# Patient Record
Sex: FEMALE | Race: WHITE | NOT HISPANIC OR LATINO | Employment: OTHER | URBAN - METROPOLITAN AREA
[De-identification: names, ages, dates, MRNs, and addresses within clinical notes are randomized per-mention and may not be internally consistent; named-entity substitution may affect disease eponyms.]

---

## 2017-04-08 LAB — HEMOCCULT STL QL IA: NORMAL

## 2017-11-24 ENCOUNTER — TRANSFERRED RECORDS (OUTPATIENT)
Dept: HEALTH INFORMATION MANAGEMENT | Facility: CLINIC | Age: 67
End: 2017-11-24

## 2018-05-23 ENCOUNTER — OFFICE VISIT (OUTPATIENT)
Dept: FAMILY MEDICINE | Facility: CLINIC | Age: 68
End: 2018-05-23
Payer: COMMERCIAL

## 2018-05-23 VITALS
HEART RATE: 83 BPM | DIASTOLIC BLOOD PRESSURE: 78 MMHG | OXYGEN SATURATION: 96 % | SYSTOLIC BLOOD PRESSURE: 132 MMHG | HEIGHT: 65 IN | WEIGHT: 170.6 LBS | TEMPERATURE: 98.2 F | BODY MASS INDEX: 28.42 KG/M2

## 2018-05-23 DIAGNOSIS — Z98.890 S/P KNEE SURGERY: ICD-10-CM

## 2018-05-23 DIAGNOSIS — E03.9 HYPOTHYROIDISM, UNSPECIFIED TYPE: ICD-10-CM

## 2018-05-23 DIAGNOSIS — Z76.89 ENCOUNTER TO ESTABLISH CARE: Primary | ICD-10-CM

## 2018-05-23 DIAGNOSIS — E03.9 HYPOTHYROIDISM: Primary | ICD-10-CM

## 2018-05-23 DIAGNOSIS — M19.231 SECONDARY OSTEOARTHRITIS OF RIGHT WRIST: ICD-10-CM

## 2018-05-23 PROBLEM — Z90.79 H/O TOTAL HYSTERECTOMY WITH BILATERAL SALPINGO-OOPHORECTOMY (BSO): Status: ACTIVE | Noted: 2018-05-23

## 2018-05-23 PROBLEM — Z90.722 H/O TOTAL HYSTERECTOMY WITH BILATERAL SALPINGO-OOPHORECTOMY (BSO): Status: ACTIVE | Noted: 2018-05-23

## 2018-05-23 PROBLEM — J30.2 SEASONAL ALLERGIC RHINITIS: Status: ACTIVE | Noted: 2018-05-23

## 2018-05-23 PROBLEM — Z90.710 H/O TOTAL HYSTERECTOMY WITH BILATERAL SALPINGO-OOPHORECTOMY (BSO): Status: ACTIVE | Noted: 2018-05-23

## 2018-05-23 PROCEDURE — 99203 OFFICE O/P NEW LOW 30 MIN: CPT | Performed by: FAMILY MEDICINE

## 2018-05-23 RX ORDER — LEVOTHYROXINE SODIUM 50 UG/1
50 TABLET ORAL DAILY
Refills: 3 | COMMUNITY
Start: 2018-03-10 | End: 2018-08-27

## 2018-05-23 RX ORDER — ALBUTEROL SULFATE 90 UG/1
2 AEROSOL, METERED RESPIRATORY (INHALATION) PRN
Refills: 3 | COMMUNITY
Start: 2018-02-21 | End: 2021-10-08

## 2018-05-23 ASSESSMENT — PAIN SCALES - GENERAL: PAINLEVEL: NO PAIN (0)

## 2018-05-23 NOTE — PROGRESS NOTES
SUBJECTIVE:   Radha Zurita is a 67 year old female who presents to clinic today for the following health issues:      Chief Complaint   Patient presents with     Establish Care     Wishing to establish care afyter moving into the state         Patient is a 67 yr old female here to establish care. Just recently moved back to [place]. She reports that she is in relative good health. She has a past medical history that is significant for hypothyroidism. Patient reports that she has had no problems with that. Last thyroid lab check was last year . She denies any weight gain , hair loss.   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     Problem list and histories reviewed & adjusted, as indicated.  Additional history: as documented    Patient Active Problem List   Diagnosis     Seasonal allergic rhinitis     Hypothyroidism     H/O total hysterectomy with bilateral salpingo-oophorectomy (BSO)     S/P knee surgery     No past surgical history on file.    Social History   Substance Use Topics     Smoking status: Not on file     Smokeless tobacco: Never Used     Alcohol use Not on file     No family history on file.      Current Outpatient Prescriptions   Medication Sig Dispense Refill     fluticasone-salmeterol (ADVAIR) 100-50 MCG/DOSE diskus inhaler Inhale 1 puff into the lungs every 12 hours       levothyroxine (SYNTHROID/LEVOTHROID) 50 MCG tablet Take 50 mcg by mouth daily  3     PROAIR  (90 Base) MCG/ACT inhaler Inhale 2 puffs into the lungs as needed  3     Allergies   Allergen Reactions     Cats      Dogs      Seasonal Allergies      BP Readings from Last 3 Encounters:   05/23/18 132/78    Wt Readings from Last 3 Encounters:   05/23/18 170 lb 9.6 oz (77.4 kg)                  Labs reviewed in EPIC    Reviewed and updated as needed this visit by clinical staff       Reviewed and updated as needed this visit by Provider         ROS:  Constitutional, HEENT,  "cardiovascular, pulmonary, gi and gu systems are negative, except as otherwise noted.    OBJECTIVE:     /78  Pulse 83  Temp 98.2  F (36.8  C) (Tympanic)  Ht 5' 4.75\" (1.645 m)  Wt 170 lb 9.6 oz (77.4 kg)  SpO2 96%  BMI 28.61 kg/m2  Body mass index is 28.61 kg/(m^2).  GENERAL: healthy, alert and no distress  EYES: Eyes grossly normal to inspection, PERRL and conjunctivae and sclerae normal  HENT: ear canals and TM's normal, nose and mouth without ulcers or lesions  NECK: no adenopathy, no asymmetry, masses, or scars and thyroid normal to palpation  RESP: lungs clear to auscultation - no rales, rhonchi or wheezes  CV: regular rate and rhythm, normal S1 S2, no S3 or S4, no murmur, click or rub, no peripheral edema and peripheral pulses strong  ABDOMEN: soft, nontender, no hepatosplenomegaly, no masses and bowel sounds normal  MS: decreased range of motion in right wrist   SKIN: no suspicious lesions or rashes    Diagnostic Test Results:  none     ASSESSMENT/PLAN:         (Z76.89) Encounter to establish care  (primary encounter diagnosis)  Comment: Patient here to establish care   Plan: Relatively healthy     (E03.9) Hypothyroidism, unspecified type  Comment: Thyroid stable , does not need refills now  Plan: CANCELED: TSH with free T4 reflex            (Z98.890) S/P knee surgery  Comment: Doing well in this  Regard   Plan: As above     (M19.231) Secondary osteoarthritis of right wrist  Comment: Had x-rays done in [place]. Will like injections in her wrist   Plan: ORTHO  REFERRAL              FUTURE APPOINTMENTS:       - Follow-up visit as needed    Mychal Gusman MD  [facility]    "

## 2018-05-23 NOTE — NURSING NOTE
"Initial /84  Pulse 83  Temp 98.2  F (36.8  C) (Tympanic)  Ht 5' 4.75\" (1.645 m)  Wt 170 lb 9.6 oz (77.4 kg)  SpO2 96%  BMI 28.61 kg/m2 Estimated body mass index is 28.61 kg/(m^2) as calculated from the following:    Height as of this encounter: 5' 4.75\" (1.645 m).    Weight as of this encounter: 170 lb 9.6 oz (77.4 kg). .      "

## 2018-05-23 NOTE — MR AVS SNAPSHOT
After Visit Summary   5/23/2018    Radha Zurita    MRN: [id number]           Patient Information     Date Of Birth          1950        Visit Information        Provider Department      5/23/2018 9:00 AM Mychal Gusman MD [facility]        Today's Diagnoses     Hypothyroidism, unspecified type    -  1    S/P knee surgery        Secondary osteoarthritis of right wrist           Follow-ups after your visit        Additional Services     ORTHO  REFERRAL       [facility] is referring you to the Orthopedic  Services at [facility].       The  Representative will assist you in the coordination of your Orthopedic and Musculoskeletal Care as prescribed by your physician.    The  Representative will call you within 1 business day to help schedule your appointment, or you may contact the  Representative at:    All areas ~ [phone]     Type of Referral : Surgical / Specialist       Timeframe requested: 3 - 5 days    Coverage of these services is subject to the terms and limitations of your health insurance plan.  Please call member services at your health plan with any benefit or coverage questions.      If X-rays, CT or MRI's have been performed, please contact the facility where they were done to arrange for , prior to your scheduled appointment.  Please bring this referral request to your appointment and present it to your specialist.                  Who to contact     If you have questions or need follow up information about today's clinic visit or your schedule please contact [facility] directly at [phone].  Normal or non-critical lab and imaging results will be communicated to you by MyChart, letter or phone within 4 business days after the clinic has received the results. If you do not hear from us within 7 days, please contact the clinic through Viv[employer]  "or phone. If you have a critical or abnormal lab result, we will notify you by phone as soon as possible.  Submit refill requests through Dada or call your pharmacy and they will forward the refill request to us. Please allow 3 business days for your refill to be completed.          Additional Information About Your Visit        [employer] Information     Dada lets you send messages to your doctor, view your test results, renew your prescriptions, schedule appointments and more. To sign up, go to [email or web address] . Click on \"Log in\" on the left side of the screen, which will take you to the Welcome page. Then click on \"Sign up Now\" on the right side of the page.     You will be asked to enter the access code listed below, as well as some personal information. Please follow the directions to create your username and password.     Your access code is: [email or web address]  Expires: 2018  8:51 AM     Your access code will  in 90 days. If you need help or a new code, please call your [facility] clinic or [phone].        Care EveryWhere ID     This is your Care EveryWhere ID. This could be used by other organizations to access your [facility] medical records  [id number]        Your Vitals Were     Pulse Temperature Height Pulse Oximetry BMI (Body Mass Index)       83 98.2  F (36.8  C) (Tympanic) 5' 4.75\" (1.645 m) 96% 28.61 kg/m2        Blood Pressure from Last 3 Encounters:   18 141/84    Weight from Last 3 Encounters:   18 170 lb 9.6 oz (77.4 kg)              We Performed the Following     ORTHO  REFERRAL     TSH with free T4 reflex        Primary Care Provider    None Specified       No primary provider on file.        Equal Access to Services     [facility]: Hadfatou Barraza, natalya calvo, jairon harealmada lilian, abdi bravo. So [facility] [phone].    ATENCIÓN: Si habla español, tiene a calhoun disposición servicios gratuitos de " asistencia lingüística. Maral al [phone].    We comply with applicable federal civil rights laws and [place] laws. We do not discriminate on the basis of race, color, national origin, age, disability, sex, sexual orientation, or gender identity.            Thank you!     Thank you for choosing [facility]  for your care. Our goal is always to provide you with excellent care. Hearing back from our patients is one way we can continue to improve our services. Please take a few minutes to complete the written survey that you may receive in the mail after your visit with us. Thank you!             Your Updated Medication List - Protect others around you: Learn how to safely use, store and throw away your medicines at www.disposemymeds.org.          This list is accurate as of 5/23/18  9:46 AM.  Always use your most recent med list.                   Brand Name Dispense Instructions for use Diagnosis    fluticasone-salmeterol 100-50 MCG/DOSE diskus inhaler    ADVAIR     Inhale 1 puff into the lungs every 12 hours        levothyroxine 50 MCG tablet    SYNTHROID/LEVOTHROID     Take 50 mcg by mouth daily        PROAIR  (90 Base) MCG/ACT Inhaler   Generic drug:  albuterol      Inhale 2 puffs into the lungs as needed

## 2018-06-21 ENCOUNTER — TRANSFERRED RECORDS (OUTPATIENT)
Dept: HEALTH INFORMATION MANAGEMENT | Facility: CLINIC | Age: 68
End: 2018-06-21

## 2018-08-22 DIAGNOSIS — E03.9 HYPOTHYROIDISM: ICD-10-CM

## 2018-08-22 LAB — TSH SERPL DL<=0.005 MIU/L-ACNC: 2.95 MU/L (ref 0.4–4)

## 2018-08-22 PROCEDURE — 36415 COLL VENOUS BLD VENIPUNCTURE: CPT | Performed by: FAMILY MEDICINE

## 2018-08-22 PROCEDURE — 84443 ASSAY THYROID STIM HORMONE: CPT | Performed by: FAMILY MEDICINE

## 2018-08-22 NOTE — LETTER
August 27, 2018      Radha Oquendo Parminder  [address] [address]        Dear Ms.Hanson Zurita,    We are writing to inform you of your test results.    Your test results fall within the expected range(s) or remain unchanged from previous results.  Please continue with current treatment plan.    Resulted Orders   TSH with free T4 reflex   Result Value Ref Range    TSH 2.95 0.40 - 4.00 mU/L       If you have any questions or concerns, please call the clinic at the number listed above.       Sincerely,        Mychal Gusman MD

## 2018-08-26 NOTE — PROGRESS NOTES
Please inform patient that test result was within normal parameters.   Thank you.     Mychal Gusman M.D.

## 2018-08-27 ENCOUNTER — MYC MEDICAL ADVICE (OUTPATIENT)
Dept: FAMILY MEDICINE | Facility: CLINIC | Age: 68
End: 2018-08-27

## 2018-08-27 DIAGNOSIS — E03.9 HYPOTHYROIDISM: Primary | ICD-10-CM

## 2018-08-27 RX ORDER — LEVOTHYROXINE SODIUM 50 UG/1
50 TABLET ORAL DAILY
Qty: 90 TABLET | Refills: 3 | Status: SHIPPED | OUTPATIENT
Start: 2018-08-27 | End: 2019-08-07

## 2018-10-05 ENCOUNTER — TRANSFERRED RECORDS (OUTPATIENT)
Dept: HEALTH INFORMATION MANAGEMENT | Facility: CLINIC | Age: 68
End: 2018-10-05

## 2018-10-16 ENCOUNTER — TRANSFERRED RECORDS (OUTPATIENT)
Dept: HEALTH INFORMATION MANAGEMENT | Facility: CLINIC | Age: 68
End: 2018-10-16

## 2018-10-23 ENCOUNTER — OFFICE VISIT (OUTPATIENT)
Dept: FAMILY MEDICINE | Facility: CLINIC | Age: 68
End: 2018-10-23
Payer: COMMERCIAL

## 2018-10-23 VITALS
RESPIRATION RATE: 12 BRPM | WEIGHT: 141 LBS | HEIGHT: 65 IN | SYSTOLIC BLOOD PRESSURE: 106 MMHG | HEART RATE: 77 BPM | DIASTOLIC BLOOD PRESSURE: 64 MMHG | BODY MASS INDEX: 23.49 KG/M2 | TEMPERATURE: 97.3 F | OXYGEN SATURATION: 98 %

## 2018-10-23 DIAGNOSIS — M19.031 PRIMARY OSTEOARTHRITIS OF RIGHT WRIST: ICD-10-CM

## 2018-10-23 DIAGNOSIS — M65.4 TENOSYNOVITIS, DE QUERVAIN: ICD-10-CM

## 2018-10-23 DIAGNOSIS — Z01.818 PREOP GENERAL PHYSICAL EXAM: Primary | ICD-10-CM

## 2018-10-23 PROCEDURE — 99214 OFFICE O/P EST MOD 30 MIN: CPT | Performed by: FAMILY MEDICINE

## 2018-10-23 NOTE — MR AVS SNAPSHOT
After Visit Summary   10/23/2018    Radha Zurita    MRN: [id number]           Patient Information     Date Of Birth          1950        Visit Information        Provider Department      10/23/2018 9:00 AM Mychal Gusman MD [facility]        Today's Diagnoses     Preop general physical exam    -  1      Care Instructions      Before Your Surgery      Call your surgeon if there is any change in your health. This includes signs of a cold or flu (such as a sore throat, runny nose, cough, rash or fever).    Do not smoke, drink alcohol or take over the counter medicine (unless your surgeon or primary care doctor tells you to) for the 24 hours before and after surgery.    If you take prescribed drugs: Follow your doctor s orders about which medicines to take and which to stop until after surgery.    Eating and drinking prior to surgery: follow the instructions from your surgeon  Take a shower or bath the night before surgery. Use the soap your surgeon gave you to gently clean your skin. If you do not have soap from your surgeon, use your regular soap. Do not shave or scrub the surgery site.  Wear clean pajamas and have clean sheets on your bed.   Presurgery Checklist  You are scheduled to have surgery. The healthcare staff will try to make your stay comfortable. Use the guidelines below to remind yourself what to do before surgery. Be sure to follow any specific pre-op instructions from your surgeon or nurse.  Preparing for Surgery  Ask your surgeon if you ll need a blood transfusion during surgery and if so, how to prepare for it. In some cases, you can donate blood before surgery. If needed, this blood can be given back (transfused) to you during or after surgery.  If you are having abdominal surgery, ask what you need to do to clear your bowel.  Tell your surgeon if you have allergies to any medications or foods.  Arrange for an adult family member or friend to drive  you home after surgery. If possible, have someone ready to help you at home as you recover.  Call the surgeon if you get a cold, fever, sore throat, diarrhea, or other health problem just before surgery. Your surgeon can decide whether or not to postpone the surgery.  Medications  Tell your surgeon about all medications you take, including prescription and over-the-counter products such as herbal remedies and vitamins. Ask if you should continue taking them.  If you take ibuprofen, naproxen, or  blood thinners  such as aspirin, clopidogrel (Plavix), or warfarin (Coumadin), ask your surgeon whether you should stop taking them and how long before surgery you should stop.  You may be told to take antibiotics just before surgery to prevent infection. If so, follow instructions carefully on how to take them.  If you are told to take medications called anticoagulants to prevent blood clots after surgery, be sure to follow the instructions on how to take them.  Stop Smoking  If you smoke, healing may take longer. So at least 2 week(s) before surgery, stop smoking.  Bathing or Showering Before Surgery  If instructed, wash with antibacterial soap. Afterward, do not use lotions or powders.  If you are having surgery on the head, you may be asked to shampoo with antibacterial soap. Follow instructions for doing so.  Do Not Remove Hair from the Surgery Site  Do not shave hair from the incision site, unless you are given specific instructions to do so. Usually, if hair needs to be removed, it will be done at the hospital right before surgery.  Don t Eat or Drink  Your doctor will tell you when to stop eating and drinking. If you do not follow your doctor's instructions, your procedure may be postponed or rescheduled for another day.  If your surgeon tells you to continue any medications, take them with small sips of water.  You can brush your teeth and rinse your mouth, but don t swallow any water.  Day of Surgery  Do not wear  makeup. Do not use perfume, deodorant, or hairspray. Remove nail polish and artificial nails.  Leave jewelry (including rings), watches, and other valuables at home.  Be sure to bring health insurance cards or forms and a photo ID.  Bring a list of your medications (include the name, dose, how often you take them, and the time last dose was taken).  Arrive on time at the hospital or surgery facility.    9357-2133 [employer]. [address] [place] [address]. All rights reserved. This information is not intended as a substitute for professional medical care. Always follow your healthcare professional's instructions.  This information has been modified by your health care provider with permission from the publisher.              Follow-ups after your visit        Your next 10 appointments already scheduled     Nov 20, 2018   Procedure with Michoacano Dunn MD   [facility] (--)    [address] [place] [id number]   [phone]           The medical center is located at [address] (between [address] in [place], four miles north of [place]).              Who to contact     If you have questions or need follow up information about today's clinic visit or your schedule please contact [facility] directly at [phone].  Normal or non-critical lab and imaging results will be communicated to you by MyChart, letter or phone within 4 business days after the clinic has received the results. If you do not hear from us within 7 days, please contact the clinic through MyChart or phone. If you have a critical or abnormal lab result, we will notify you by phone as soon as possible.  Submit refill requests through GetThis or call your pharmacy and they will forward the refill request to us. Please allow 3 business days for your refill to be completed.          Additional Information About Your Visit        MyChart Information     ShelfF[employer]pt  "gives you secure access to your electronic health record. If you see a primary care provider, you can also send messages to your care team and make appointments. If you have questions, please call your primary care clinic.  If you do not have a primary care provider, please call [phone] and they will assist you.        Care EveryWhere ID     This is your Care EveryWhere ID. This could be used by other organizations to access your [facility] medical records  [id number]        Your Vitals Were     Pulse Temperature Respirations Height Pulse Oximetry BMI (Body Mass Index)    77 97.3  F (36.3  C) (Tympanic) 12 5' 4.75\" (1.645 m) 98% 23.65 kg/m2       Blood Pressure from Last 3 Encounters:   10/23/18 106/64   05/23/18 132/78    Weight from Last 3 Encounters:   10/23/18 141 lb (64 kg)   05/23/18 170 lb 9.6 oz (77.4 kg)              Today, you had the following     No orders found for display       Primary Care Provider Fax #    Physician No Ref-Primary [phone]       No address on file        Equal Access to Services     [facility] : Hadii honorio cuellaro Sorodger, waaxda luqadaha, qaybta kaalmada lilian, abdi casanova . So [facility] [phone].    ATENCIÓN: Si habla español, tiene a calhoun disposición servicios gratuitos de asistencia lingüística. Maral al [phone].    We comply with applicable federal civil rights laws and [place] laws. We do not discriminate on the basis of race, color, national origin, age, disability, sex, sexual orientation, or gender identity.            Thank you!     Thank you for choosing [facility]  for your care. Our goal is always to provide you with excellent care. Hearing back from our patients is one way we can continue to improve our services. Please take a few minutes to complete the written survey that you may receive in the mail after your visit with us. Thank you!             Your Updated Medication List - Protect others around you: " Learn how to safely use, store and throw away your medicines at www.disposemymeds.org.          This list is accurate as of 10/23/18  9:20 AM.  Always use your most recent med list.                   Brand Name Dispense Instructions for use Diagnosis    fluticasone-salmeterol 100-50 MCG/DOSE diskus inhaler    ADVAIR     Inhale 1 puff into the lungs every 12 hours        levothyroxine 50 MCG tablet    SYNTHROID/LEVOTHROID    90 tablet    Take 1 tablet (50 mcg) by mouth daily    Hypothyroidism       PROAIR  (90 Base) MCG/ACT inhaler   Generic drug:  albuterol      Inhale 2 puffs into the lungs as needed

## 2018-10-23 NOTE — PROGRESS NOTES
[facility]  [address] [facility] [address]  [phone]  Dept: [phone]    PRE-OP EVALUATION:  Today's date: 10/23/2018    Radha Zurita (: 1950) presents for pre-operative evaluation assessment as requested by Dr. healy.  She requires evaluation and anesthesia risk assessment prior to undergoing surgery/procedure for treatment of   Right Wrist 1st Dorsal Compartment Release Right      .    Proposed Surgery/ Procedure: Right Wrist 1st Dorsal Compartment Release  Date of Surgery/ Procedure: 18  Time of Surgery/ Procedure: 1:30pm  Hospital/Surgical Facility: [facility] [place]    Primary Physician: No Ref-Primary, Physician  Type of Anesthesia Anticipated: Monitor Anesthesia Care    Patient has a Health Care Directive or Living Will:  YES at home     1. NO - Do you have a history of heart attack, stroke, stent, bypass or surgery on an artery in the head, neck, heart or legs?  2. NO - Do you ever have any pain or discomfort in your chest?  3. NO - Do you have a history of  Heart Failure?  4. NO - Are you troubled by shortness of breath when: walking on the level, up a slight hill or at night?  5. NO - Do you currently have a cold, bronchitis or other respiratory infection?  6. NO - Do you have a cough, shortness of breath or wheezing?  7. NO - Do you sometimes get pains in the calves of your legs when you walk?  8. NO - Do you or anyone in your family have previous history of blood clots?  9. NO - Do you or does anyone in your family have a serious bleeding problem such as prolonged bleeding following surgeries or cuts?  10. NO - Have you ever had problems with anemia or been told to take iron pills?  11. NO - Have you had any abnormal blood loss such as black, tarry or bloody stools, or abnormal vaginal bleeding?  12. NO - Have you ever had a blood transfusion?  13. NO - Have you or any of your relatives ever had problems with anesthesia?  14. NO - Do you have sleep apnea,  excessive snoring or daytime drowsiness?  15. NO - Do you have any prosthetic heart valves?  16. NO - Do you have prosthetic joints?  17. NO - Is there any chance that you may be pregnant?      HPI:     HPI related to upcoming procedure: Patient is a 68 yr old female here for a pre op evaluation. Patient is having a right wrist 1st dorsal compartment release. Patient states that she has had carpal tunnel release in the same hand before. No acute symptoms today and she feels well. She has never had a cardiovascular event .       See problem list for active medical problems.  Problems all longstanding and stable, except as noted/documented.  See ROS for pertinent symptoms related to these conditions.                                                                                                                                                          .    MEDICAL HISTORY:     Patient Active Problem List    Diagnosis Date Noted     Seasonal allergic rhinitis 05/23/2018     Priority: Medium     Hypothyroidism 05/23/2018     Priority: Medium     H/O total hysterectomy with bilateral salpingo-oophorectomy (BSO) 05/23/2018     Priority: Medium     S/P knee surgery 05/23/2018     Priority: Medium     Left knee        History reviewed. No pertinent past medical history.  History reviewed. No pertinent surgical history.  Current Outpatient Prescriptions   Medication Sig Dispense Refill     fluticasone-salmeterol (ADVAIR) 100-50 MCG/DOSE diskus inhaler Inhale 1 puff into the lungs every 12 hours       levothyroxine (SYNTHROID/LEVOTHROID) 50 MCG tablet Take 1 tablet (50 mcg) by mouth daily 90 tablet 3     PROAIR  (90 Base) MCG/ACT inhaler Inhale 2 puffs into the lungs as needed  3     OTC products: None, except as noted above    Allergies   Allergen Reactions     Cats      Dogs      Seasonal Allergies       Latex Allergy: NO    Social History   Substance Use Topics     Smoking status: Former Smoker     Smokeless tobacco:  "Never Used     Alcohol use Not on file     History   Drug Use Not on file       REVIEW OF SYSTEMS:   CONSTITUTIONAL: NEGATIVE for fever, chills, change in weight  INTEGUMENTARY/SKIN: NEGATIVE for worrisome rashes, moles or lesions  EYES: NEGATIVE for vision changes or irritation  ENT/MOUTH: NEGATIVE for ear, mouth and throat problems  RESP: NEGATIVE for significant cough or SOB  CV: NEGATIVE for chest pain, palpitations or peripheral edema  GI: NEGATIVE for nausea, abdominal pain, heartburn, or change in bowel habits  : NEGATIVE for frequency, dysuria, or hematuria  MUSCULOSKELETAL:POSITIVE  for joint pain right wrist   ENDOCRINE: NEGATIVE for temperature intolerance, skin/hair changes  HEME: NEGATIVE for bleeding problems  PSYCHIATRIC: NEGATIVE for changes in mood or affect    EXAM:   /64  Pulse 77  Temp 97.3  F (36.3  C) (Tympanic)  Resp 12  Ht 5' 4.75\" (1.645 m)  Wt 141 lb (64 kg)  SpO2 98%  BMI 23.65 kg/m2    GENERAL APPEARANCE: healthy, alert and no distress     EYES: EOMI, PERRL     HENT: ear canals and TM's normal and nose and mouth without ulcers or lesions     NECK: no adenopathy, no asymmetry, masses, or scars and thyroid normal to palpation     RESP: lungs clear to auscultation - no rales, rhonchi or wheezes     CV: regular rates and rhythm, normal S1 S2, no S3 or S4 and no murmur, click or rub     ABDOMEN:  soft, nontender, no HSM or masses and bowel sounds normal     MS: decreased range of motion right wrist      SKIN: no suspicious lesions or rashes     PSYCH: mentation appears normal. and affect normal/bright     LYMPHATICS: No cervical adenopathy    DIAGNOSTICS:   No labs or EKG required for low risk surgery (cataract, skin procedure, breast biopsy, etc)    No results for input(s): HGB, PLT, INR, NA, POTASSIUM, CR, A1C in the last 83128 hours.     IMPRESSION:   Reason for surgery/procedure: Right wrist tendonitis   Diagnosis/reason for consult: pre op evaluation     The proposed " surgical procedure is considered LOW risk.    REVISED CARDIAC RISK INDEX  The patient has the following serious cardiovascular risks for perioperative complications such as (MI, PE, VFib and 3  AV Block):  No serious cardiac risks  INTERPRETATION: 0 risks: Class I (very low risk - 0.4% complication rate)    The patient has the following additional risks for perioperative complications:  No identified additional risks  The ASCVD Risk score ([place] YOANDY Jr, et al., 2013) failed to calculate for the following reasons:    Cannot find a previous HDL lab    Cannot find a previous total cholesterol lab      ICD-10-CM    1. Preop general physical exam Z01.818    2. 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 Tenosynovitis, de Quervain M65.4        RECOMMENDATIONS:       Cardiovascular Risk  Performs 4 METs exercise without symptoms (Light housework (dusting, washing dishes)) .       --Patient is to take all scheduled medications on the day of surgery EXCEPT for modifications listed below.    Anticoagulant or Antiplatelet Medication Use  ASPIRIN: Discontinue ASA 7-10 days prior to procedure to reduce bleeding risk.  It should be resumed post-operatively.  NSAIDS: Ibuprofen (Motrin):         Stop one week prior to surgery  Naproxen (Naprosyn):   Stop 1 week prior to surgery        APPROVAL GIVEN to proceed with proposed procedure, without further diagnostic evaluation       Signed Electronically by: Mychal Gusman MD    Copy of this evaluation report is provided to requesting physician.    [place] Preop Guidelines    Revised Cardiac Risk Index

## 2018-10-23 NOTE — PATIENT INSTRUCTIONS
Before Your Surgery      Call your surgeon if there is any change in your health. This includes signs of a cold or flu (such as a sore throat, runny nose, cough, rash or fever).    Do not smoke, drink alcohol or take over the counter medicine (unless your surgeon or primary care doctor tells you to) for the 24 hours before and after surgery.    If you take prescribed drugs: Follow your doctor s orders about which medicines to take and which to stop until after surgery.    Eating and drinking prior to surgery: follow the instructions from your surgeon  Take a shower or bath the night before surgery. Use the soap your surgeon gave you to gently clean your skin. If you do not have soap from your surgeon, use your regular soap. Do not shave or scrub the surgery site.  Wear clean pajamas and have clean sheets on your bed.   Presurgery Checklist  You are scheduled to have surgery. The healthcare staff will try to make your stay comfortable. Use the guidelines below to remind yourself what to do before surgery. Be sure to follow any specific pre-op instructions from your surgeon or nurse.  Preparing for Surgery  Ask your surgeon if you ll need a blood transfusion during surgery and if so, how to prepare for it. In some cases, you can donate blood before surgery. If needed, this blood can be given back (transfused) to you during or after surgery.  If you are having abdominal surgery, ask what you need to do to clear your bowel.  Tell your surgeon if you have allergies to any medications or foods.  Arrange for an adult family member or friend to drive you home after surgery. If possible, have someone ready to help you at home as you recover.  Call the surgeon if you get a cold, fever, sore throat, diarrhea, or other health problem just before surgery. Your surgeon can decide whether or not to postpone the surgery.  Medications  Tell your surgeon about all medications you take, including prescription and over-the-counter  products such as herbal remedies and vitamins. Ask if you should continue taking them.  If you take ibuprofen, naproxen, or  blood thinners  such as aspirin, clopidogrel (Plavix), or warfarin (Coumadin), ask your surgeon whether you should stop taking them and how long before surgery you should stop.  You may be told to take antibiotics just before surgery to prevent infection. If so, follow instructions carefully on how to take them.  If you are told to take medications called anticoagulants to prevent blood clots after surgery, be sure to follow the instructions on how to take them.  Stop Smoking  If you smoke, healing may take longer. So at least 2 week(s) before surgery, stop smoking.  Bathing or Showering Before Surgery  If instructed, wash with antibacterial soap. Afterward, do not use lotions or powders.  If you are having surgery on the head, you may be asked to shampoo with antibacterial soap. Follow instructions for doing so.  Do Not Remove Hair from the Surgery Site  Do not shave hair from the incision site, unless you are given specific instructions to do so. Usually, if hair needs to be removed, it will be done at the hospital right before surgery.  Don t Eat or Drink  Your doctor will tell you when to stop eating and drinking. If you do not follow your doctor's instructions, your procedure may be postponed or rescheduled for another day.  If your surgeon tells you to continue any medications, take them with small sips of water.  You can brush your teeth and rinse your mouth, but don t swallow any water.  Day of Surgery  Do not wear makeup. Do not use perfume, deodorant, or hairspray. Remove nail polish and artificial nails.  Leave jewelry (including rings), watches, and other valuables at home.  Be sure to bring health insurance cards or forms and a photo ID.  Bring a list of your medications (include the name, dose, how often you take them, and the time last dose was taken).  Arrive on time at the  hospital or surgery facility.    5134-0735 [employer]. [address] [place] [address], [place] [address]. All rights reserved. This information is not intended as a substitute for professional medical care. Always follow your healthcare professional's instructions.  This information has been modified by your health care provider with permission from the publisher.

## 2018-11-19 ENCOUNTER — ANESTHESIA EVENT (OUTPATIENT)
Dept: SURGERY | Facility: CLINIC | Age: 68
End: 2018-11-19
Payer: COMMERCIAL

## 2018-11-19 NOTE — ANESTHESIA PREPROCEDURE EVALUATION
Anesthesia Evaluation     . Pt has had prior anesthetic. Type: General           ROS/MED HX    ENT/Pulmonary:     (+)allergic rhinitis, tobacco use, Past use Intermittent asthma Treatment: Inhaler prn,  , . .    Neurologic:  - neg neurologic ROS     Cardiovascular:  - neg cardiovascular ROS       METS/Exercise Tolerance:  >4 METS   Hematologic:  - neg hematologic  ROS       Musculoskeletal:   (+) arthritis, , , -       GI/Hepatic:  - neg GI/hepatic ROS       Renal/Genitourinary:  - ROS Renal section negative       Endo:     (+) thyroid problem hypothyroidism, .      Psychiatric:  - neg psychiatric ROS       Infectious Disease:  - neg infectious disease ROS       Malignancy:      - no malignancy   Other:    - neg other ROS                 Physical Exam  Normal systems: cardiovascular, pulmonary and dental    Airway   Mallampati: I  TM distance: >3 FB  Neck ROM: full    Dental     Cardiovascular       Pulmonary                     Anesthesia Plan      History & Physical Review  History and physical reviewed and following examination; no interval change.    ASA Status:  2 .    NPO Status:  > 8 hours    Plan for MAC Maintenance will be Balanced.  Reason for MAC:  Deep or markedly invasive procedure (G8)  PONV prophylaxis:  Ondansetron (or other 5HT-3) and Dexamethasone or Solumedrol       Postoperative Care  Postoperative pain management:  IV analgesics and Oral pain medications.      Consents  Anesthetic plan, risks, benefits and alternatives discussed with:  Patient and Spouse..                          .

## 2018-11-20 ENCOUNTER — HOSPITAL ENCOUNTER (OUTPATIENT)
Facility: CLINIC | Age: 68
Discharge: HOME | End: 2018-11-20
Attending: ORTHOPAEDIC SURGERY | Admitting: ORTHOPAEDIC SURGERY
Payer: COMMERCIAL

## 2018-11-20 ENCOUNTER — SURGERY (OUTPATIENT)
Age: 68
End: 2018-11-20

## 2018-11-20 ENCOUNTER — ANESTHESIA (OUTPATIENT)
Dept: SURGERY | Facility: CLINIC | Age: 68
End: 2018-11-20
Payer: COMMERCIAL

## 2018-11-20 VITALS
RESPIRATION RATE: 16 BRPM | HEART RATE: 78 BPM | DIASTOLIC BLOOD PRESSURE: 78 MMHG | BODY MASS INDEX: 23.49 KG/M2 | SYSTOLIC BLOOD PRESSURE: 117 MMHG | TEMPERATURE: 98.3 F | OXYGEN SATURATION: 100 % | HEIGHT: 65 IN | WEIGHT: 141 LBS

## 2018-11-20 DIAGNOSIS — M65.4 RADIAL STYLOID TENOSYNOVITIS (DE QUERVAIN): Primary | ICD-10-CM

## 2018-11-20 PROCEDURE — 25000125 ZZHC RX 250: Performed by: NURSE ANESTHETIST, CERTIFIED REGISTERED

## 2018-11-20 PROCEDURE — 25000132 ZZH RX MED GY IP 250 OP 250 PS 637: Performed by: PHYSICIAN ASSISTANT

## 2018-11-20 PROCEDURE — 71000027 ZZH RECOVERY PHASE 2 EACH 15 MINS: Performed by: ORTHOPAEDIC SURGERY

## 2018-11-20 PROCEDURE — 37000008 ZZH ANESTHESIA TECHNICAL FEE, 1ST 30 MIN: Performed by: ORTHOPAEDIC SURGERY

## 2018-11-20 PROCEDURE — 25000125 ZZHC RX 250: Performed by: ORTHOPAEDIC SURGERY

## 2018-11-20 PROCEDURE — 25000128 H RX IP 250 OP 636: Performed by: NURSE ANESTHETIST, CERTIFIED REGISTERED

## 2018-11-20 PROCEDURE — 36000052 ZZH SURGERY LEVEL 2 EA 15 ADDTL MIN: Performed by: ORTHOPAEDIC SURGERY

## 2018-11-20 PROCEDURE — 36000050 ZZH SURGERY LEVEL 2 1ST 30 MIN: Performed by: ORTHOPAEDIC SURGERY

## 2018-11-20 PROCEDURE — 27210794 ZZH OR GENERAL SUPPLY STERILE: Performed by: ORTHOPAEDIC SURGERY

## 2018-11-20 PROCEDURE — 37000009 ZZH ANESTHESIA TECHNICAL FEE, EACH ADDTL 15 MIN: Performed by: ORTHOPAEDIC SURGERY

## 2018-11-20 PROCEDURE — 40000306 ZZH STATISTIC PRE PROC ASSESS II: Performed by: ORTHOPAEDIC SURGERY

## 2018-11-20 PROCEDURE — 25000128 H RX IP 250 OP 636: Performed by: ORTHOPAEDIC SURGERY

## 2018-11-20 RX ORDER — MEPERIDINE HYDROCHLORIDE 25 MG/ML
12.5 INJECTION INTRAMUSCULAR; INTRAVENOUS; SUBCUTANEOUS
Status: DISCONTINUED | OUTPATIENT
Start: 2018-11-20 | End: 2018-11-20 | Stop reason: HOSPADM

## 2018-11-20 RX ORDER — LIDOCAINE HYDROCHLORIDE 10 MG/ML
INJECTION, SOLUTION INFILTRATION; PERINEURAL PRN
Status: DISCONTINUED | OUTPATIENT
Start: 2018-11-20 | End: 2018-11-20

## 2018-11-20 RX ORDER — SODIUM CHLORIDE, SODIUM LACTATE, POTASSIUM CHLORIDE, CALCIUM CHLORIDE 600; 310; 30; 20 MG/100ML; MG/100ML; MG/100ML; MG/100ML
INJECTION, SOLUTION INTRAVENOUS CONTINUOUS
Status: DISCONTINUED | OUTPATIENT
Start: 2018-11-20 | End: 2018-11-20 | Stop reason: HOSPADM

## 2018-11-20 RX ORDER — HYDROMORPHONE HYDROCHLORIDE 1 MG/ML
.3-.5 INJECTION, SOLUTION INTRAMUSCULAR; INTRAVENOUS; SUBCUTANEOUS EVERY 10 MIN PRN
Status: DISCONTINUED | OUTPATIENT
Start: 2018-11-20 | End: 2018-11-20 | Stop reason: HOSPADM

## 2018-11-20 RX ORDER — FENTANYL CITRATE 50 UG/ML
25-50 INJECTION, SOLUTION INTRAMUSCULAR; INTRAVENOUS
Status: CANCELLED | OUTPATIENT
Start: 2018-11-20

## 2018-11-20 RX ORDER — ACETAMINOPHEN 325 MG/1
975 TABLET ORAL ONCE
Status: COMPLETED | OUTPATIENT
Start: 2018-11-20 | End: 2018-11-20

## 2018-11-20 RX ORDER — GABAPENTIN 100 MG/1
100 CAPSULE ORAL
Status: COMPLETED | OUTPATIENT
Start: 2018-11-20 | End: 2018-11-20

## 2018-11-20 RX ORDER — GLYCOPYRROLATE 0.2 MG/ML
INJECTION, SOLUTION INTRAMUSCULAR; INTRAVENOUS PRN
Status: DISCONTINUED | OUTPATIENT
Start: 2018-11-20 | End: 2018-11-20

## 2018-11-20 RX ORDER — DEXAMETHASONE SODIUM PHOSPHATE 4 MG/ML
4 INJECTION, SOLUTION INTRA-ARTICULAR; INTRALESIONAL; INTRAMUSCULAR; INTRAVENOUS; SOFT TISSUE EVERY 10 MIN PRN
Status: DISCONTINUED | OUTPATIENT
Start: 2018-11-20 | End: 2018-11-20 | Stop reason: HOSPADM

## 2018-11-20 RX ORDER — HYDROCODONE BITARTRATE AND ACETAMINOPHEN 5; 325 MG/1; MG/1
1 TABLET ORAL
Status: DISCONTINUED | OUTPATIENT
Start: 2018-11-20 | End: 2018-11-20 | Stop reason: HOSPADM

## 2018-11-20 RX ORDER — ALBUTEROL SULFATE 0.83 MG/ML
2.5 SOLUTION RESPIRATORY (INHALATION) EVERY 4 HOURS PRN
Status: CANCELLED | OUTPATIENT
Start: 2018-11-20

## 2018-11-20 RX ORDER — BUPIVACAINE HYDROCHLORIDE 5 MG/ML
INJECTION, SOLUTION PERINEURAL PRN
Status: DISCONTINUED | OUTPATIENT
Start: 2018-11-20 | End: 2018-11-20 | Stop reason: HOSPADM

## 2018-11-20 RX ORDER — DEXAMETHASONE SODIUM PHOSPHATE 4 MG/ML
INJECTION, SOLUTION INTRA-ARTICULAR; INTRALESIONAL; INTRAMUSCULAR; INTRAVENOUS; SOFT TISSUE PRN
Status: DISCONTINUED | OUTPATIENT
Start: 2018-11-20 | End: 2018-11-20

## 2018-11-20 RX ORDER — CELECOXIB 200 MG/1
200 CAPSULE ORAL
Status: DISCONTINUED | OUTPATIENT
Start: 2018-11-20 | End: 2018-11-20 | Stop reason: HOSPADM

## 2018-11-20 RX ORDER — ONDANSETRON 2 MG/ML
INJECTION INTRAMUSCULAR; INTRAVENOUS PRN
Status: DISCONTINUED | OUTPATIENT
Start: 2018-11-20 | End: 2018-11-20

## 2018-11-20 RX ORDER — ONDANSETRON 2 MG/ML
4 INJECTION INTRAMUSCULAR; INTRAVENOUS EVERY 30 MIN PRN
Status: DISCONTINUED | OUTPATIENT
Start: 2018-11-20 | End: 2018-11-20 | Stop reason: HOSPADM

## 2018-11-20 RX ORDER — FENTANYL CITRATE 50 UG/ML
INJECTION, SOLUTION INTRAMUSCULAR; INTRAVENOUS PRN
Status: DISCONTINUED | OUTPATIENT
Start: 2018-11-20 | End: 2018-11-20

## 2018-11-20 RX ORDER — HYDROCODONE BITARTRATE AND ACETAMINOPHEN 5; 325 MG/1; MG/1
1 TABLET ORAL EVERY 4 HOURS PRN
Qty: 8 TABLET | Refills: 0
Start: 2018-11-20 | End: 2019-08-07

## 2018-11-20 RX ORDER — LIDOCAINE 40 MG/G
CREAM TOPICAL
Status: DISCONTINUED | OUTPATIENT
Start: 2018-11-20 | End: 2018-11-20 | Stop reason: HOSPADM

## 2018-11-20 RX ORDER — PROPOFOL 10 MG/ML
INJECTION, EMULSION INTRAVENOUS CONTINUOUS PRN
Status: DISCONTINUED | OUTPATIENT
Start: 2018-11-20 | End: 2018-11-20

## 2018-11-20 RX ORDER — ONDANSETRON 4 MG/1
4 TABLET, ORALLY DISINTEGRATING ORAL EVERY 30 MIN PRN
Status: DISCONTINUED | OUTPATIENT
Start: 2018-11-20 | End: 2018-11-20 | Stop reason: HOSPADM

## 2018-11-20 RX ORDER — NALOXONE HYDROCHLORIDE 0.4 MG/ML
.1-.4 INJECTION, SOLUTION INTRAMUSCULAR; INTRAVENOUS; SUBCUTANEOUS
Status: DISCONTINUED | OUTPATIENT
Start: 2018-11-20 | End: 2018-11-20 | Stop reason: HOSPADM

## 2018-11-20 RX ORDER — LIDOCAINE HYDROCHLORIDE 10 MG/ML
INJECTION, SOLUTION INFILTRATION; PERINEURAL PRN
Status: DISCONTINUED | OUTPATIENT
Start: 2018-11-20 | End: 2018-11-20 | Stop reason: HOSPADM

## 2018-11-20 RX ADMIN — DEXAMETHASONE SODIUM PHOSPHATE 4 MG: 4 INJECTION, SOLUTION INTRA-ARTICULAR; INTRALESIONAL; INTRAMUSCULAR; INTRAVENOUS; SOFT TISSUE at 13:38

## 2018-11-20 RX ADMIN — SODIUM CHLORIDE, POTASSIUM CHLORIDE, SODIUM LACTATE AND CALCIUM CHLORIDE: 600; 310; 30; 20 INJECTION, SOLUTION INTRAVENOUS at 12:40

## 2018-11-20 RX ADMIN — ONDANSETRON 4 MG: 2 INJECTION INTRAMUSCULAR; INTRAVENOUS at 13:38

## 2018-11-20 RX ADMIN — GABAPENTIN 100 MG: 100 CAPSULE ORAL at 12:42

## 2018-11-20 RX ADMIN — MIDAZOLAM 2 MG: 1 INJECTION INTRAMUSCULAR; INTRAVENOUS at 13:34

## 2018-11-20 RX ADMIN — LIDOCAINE HYDROCHLORIDE 0.2 ML: 10 INJECTION, SOLUTION EPIDURAL; INFILTRATION; INTRACAUDAL; PERINEURAL at 12:40

## 2018-11-20 RX ADMIN — GENTAMICIN SULFATE 100 ML: 40 INJECTION, SOLUTION INTRAMUSCULAR; INTRAVENOUS at 14:00

## 2018-11-20 RX ADMIN — FENTANYL CITRATE 50 MCG: 50 INJECTION, SOLUTION INTRAMUSCULAR; INTRAVENOUS at 13:38

## 2018-11-20 RX ADMIN — LIDOCAINE HYDROCHLORIDE 2.5 ML: 10 INJECTION, SOLUTION INFILTRATION; PERINEURAL at 13:54

## 2018-11-20 RX ADMIN — GLYCOPYRROLATE 0.1 MG: 0.2 INJECTION, SOLUTION INTRAMUSCULAR; INTRAVENOUS at 13:34

## 2018-11-20 RX ADMIN — ACETAMINOPHEN 975 MG: 325 TABLET, FILM COATED ORAL at 12:42

## 2018-11-20 RX ADMIN — BUPIVACAINE HYDROCHLORIDE 2.5 ML: 5 INJECTION, SOLUTION PERINEURAL at 13:54

## 2018-11-20 RX ADMIN — PROPOFOL 75 MCG/KG/MIN: 10 INJECTION, EMULSION INTRAVENOUS at 13:38

## 2018-11-20 RX ADMIN — GLYCOPYRROLATE 0.1 MG: 0.2 INJECTION, SOLUTION INTRAMUSCULAR; INTRAVENOUS at 13:36

## 2018-11-20 RX ADMIN — LIDOCAINE HYDROCHLORIDE 100 MG: 10 INJECTION, SOLUTION INFILTRATION; PERINEURAL at 13:38

## 2018-11-20 RX ADMIN — FENTANYL CITRATE 50 MCG: 50 INJECTION, SOLUTION INTRAMUSCULAR; INTRAVENOUS at 13:36

## 2018-11-20 ASSESSMENT — LIFESTYLE VARIABLES: TOBACCO_USE: 1

## 2018-11-20 NOTE — ANESTHESIA POSTPROCEDURE EVALUATION
Patient: Radha Zurita    Procedure(s):  Right Wrist 1st Dorsal Compartment Release    Diagnosis:right wrist dequervains tenosynovitis  Diagnosis Additional Information: No value filed.    Anesthesia Type:  No value filed.    Note:  Anesthesia Post Evaluation    Patient location during evaluation: Phase 2  Patient participation: Able to fully participate in evaluation  Level of consciousness: awake and alert  Pain management: adequate  Airway patency: patent  Cardiovascular status: acceptable and hemodynamically stable  Respiratory status: acceptable, room air and spontaneous ventilation  Hydration status: acceptable  PONV: none     Anesthetic complications: None          Last vitals:  Vitals:    11/20/18 1219   BP: 128/84   Pulse: 78   Resp: 16   Temp: 36.8  C (98.3  F)         Electronically Signed By: PIPO Zayas CRNA  November 20, 2018  1:44 PM

## 2018-11-20 NOTE — OP NOTE
[facility]   Operative Note    SURGEON:  Michoacano Dunn M.D.    ASSISTANT:  Yamilet Quispe PA-C    PREOPERATIVE DIAGNOSES  right wrist de Quervain's tenosynovitis    POSTOPERATIVE DIAGNOSES  right wrist de Quervain's tenosynovitis    OPERATIONS  right wrist 1st dorsal compartment release    ANESTHESIA  Local anesthesia with sedation    SPECIMENS  None.    DRAINS  None.    COMPLICATIONS  None.    ESTIMATED BLOOD LOSS  * No values recorded between 11/20/2018  1:54 PM and 11/20/2018  2:09 PM *    PROCEDURE  After discussing the risks, benefits, and alternatives to the procedure with the patient, the patient consented to proceed with the surgery as described below.  She understands the potential for neurovascular injury, infection, wound healing problems, tendon subluxation, recurrence, decreased range of motion, and a decreased quality of life.    The patient was brought to the operating room and was placed on the operating table in a supine position.  Anesthesia was provided by the anesthesiology staff in the manner described above.  A tourniquet was applied to the right upper extremity, the extremity was exsanguinated and the tourniquet was elevated to 250 mmHg for the duration of the case.  a 50:50 combination of 0.5% marcaine and 1% lidocaine was injected around the intended operative site over the radial styloid region of the left wrist.    A 2-cm oblique incision was made over the radial styloid of the right wrist overlying the distribution of the 1st extensor tendon compartment.  The superficial branch of the radial nerve was identified and was retracted dorsally.  The retinaculum was markedly thickened and a longitudinal incision was made through the dosum of this structure.  No septum was identified.    There were 4 slips of APL tendon and 1 slips of EPB tendon.  The tendons were inflamed in appearance.  A volar-based flap of retinaculum was created to prevent volar subluxation  of the tendons.    A small Z-plasty was performed and the retinaculum was very gently reapproximated in a very loose position to avoid re-constricture.  The incision was thoroughly irrigated and the incision was reapproximated with 4-0 Monocryl suture.  A well-padded thumb spica splint was applied.  The tourniquet was deflated for a total tourniquet time of 11 minutes.    The patient was taken to the recovery room in stable condition.

## 2018-11-20 NOTE — H&P (VIEW-ONLY)
[facility]  [address] [address]  [phone]  Dept: [phone]    PRE-OP EVALUATION:  Today's date: 10/23/2018    Radha Zurita (: 1950) presents for pre-operative evaluation assessment as requested by Dr. healy.  She requires evaluation and anesthesia risk assessment prior to undergoing surgery/procedure for treatment of   Right Wrist 1st Dorsal Compartment Release Right      .    Proposed Surgery/ Procedure: Right Wrist 1st Dorsal Compartment Release  Date of Surgery/ Procedure: 18  Time of Surgery/ Procedure: 1:30pm  Hospital/Surgical Facility: [facility] [place]    Primary Physician: No Ref-Primary, Physician  Type of Anesthesia Anticipated: Monitor Anesthesia Care    Patient has a Health Care Directive or Living Will:  YES at home     1. NO - Do you have a history of heart attack, stroke, stent, bypass or surgery on an artery in the head, neck, heart or legs?  2. NO - Do you ever have any pain or discomfort in your chest?  3. NO - Do you have a history of  Heart Failure?  4. NO - Are you troubled by shortness of breath when: walking on the level, up a slight hill or at night?  5. NO - Do you currently have a cold, bronchitis or other respiratory infection?  6. NO - Do you have a cough, shortness of breath or wheezing?  7. NO - Do you sometimes get pains in the calves of your legs when you walk?  8. NO - Do you or anyone in your family have previous history of blood clots?  9. NO - Do you or does anyone in your family have a serious bleeding problem such as prolonged bleeding following surgeries or cuts?  10. NO - Have you ever had problems with anemia or been told to take iron pills?  11. NO - Have you had any abnormal blood loss such as black, tarry or bloody stools, or abnormal vaginal bleeding?  12. NO - Have you ever had a blood transfusion?  13. NO - Have you or any of your relatives ever had problems with anesthesia?  14. NO - Do you have sleep apnea,  excessive snoring or daytime drowsiness?  15. NO - Do you have any prosthetic heart valves?  16. NO - Do you have prosthetic joints?  17. NO - Is there any chance that you may be pregnant?      HPI:     HPI related to upcoming procedure: Patient is a 68 yr old female here for a pre op evaluation. Patient is having a right wrist 1st dorsal compartment release. Patient states that she has had carpal tunnel release in the same hand before. No acute symptoms today and she feels well. She has never had a cardiovascular event .       See problem list for active medical problems.  Problems all longstanding and stable, except as noted/documented.  See ROS for pertinent symptoms related to these conditions.                                                                                                                                                          .    MEDICAL HISTORY:     Patient Active Problem List    Diagnosis Date Noted     Seasonal allergic rhinitis 05/23/2018     Priority: Medium     Hypothyroidism 05/23/2018     Priority: Medium     H/O total hysterectomy with bilateral salpingo-oophorectomy (BSO) 05/23/2018     Priority: Medium     S/P knee surgery 05/23/2018     Priority: Medium     Left knee        History reviewed. No pertinent past medical history.  History reviewed. No pertinent surgical history.  Current Outpatient Prescriptions   Medication Sig Dispense Refill     fluticasone-salmeterol (ADVAIR) 100-50 MCG/DOSE diskus inhaler Inhale 1 puff into the lungs every 12 hours       levothyroxine (SYNTHROID/LEVOTHROID) 50 MCG tablet Take 1 tablet (50 mcg) by mouth daily 90 tablet 3     PROAIR  (90 Base) MCG/ACT inhaler Inhale 2 puffs into the lungs as needed  3     OTC products: None, except as noted above    Allergies   Allergen Reactions     Cats      Dogs      Seasonal Allergies       Latex Allergy: NO    Social History   Substance Use Topics     Smoking status: Former Smoker     Smokeless tobacco:  "Never Used     Alcohol use Not on file     History   Drug Use Not on file       REVIEW OF SYSTEMS:   CONSTITUTIONAL: NEGATIVE for fever, chills, change in weight  INTEGUMENTARY/SKIN: NEGATIVE for worrisome rashes, moles or lesions  EYES: NEGATIVE for vision changes or irritation  ENT/MOUTH: NEGATIVE for ear, mouth and throat problems  RESP: NEGATIVE for significant cough or SOB  CV: NEGATIVE for chest pain, palpitations or peripheral edema  GI: NEGATIVE for nausea, abdominal pain, heartburn, or change in bowel habits  : NEGATIVE for frequency, dysuria, or hematuria  MUSCULOSKELETAL:POSITIVE  for joint pain right wrist   ENDOCRINE: NEGATIVE for temperature intolerance, skin/hair changes  HEME: NEGATIVE for bleeding problems  PSYCHIATRIC: NEGATIVE for changes in mood or affect    EXAM:   /64  Pulse 77  Temp 97.3  F (36.3  C) (Tympanic)  Resp 12  Ht 5' 4.75\" (1.645 m)  Wt 141 lb (64 kg)  SpO2 98%  BMI 23.65 kg/m2    GENERAL APPEARANCE: healthy, alert and no distress     EYES: EOMI, PERRL     HENT: ear canals and TM's normal and nose and mouth without ulcers or lesions     NECK: no adenopathy, no asymmetry, masses, or scars and thyroid normal to palpation     RESP: lungs clear to auscultation - no rales, rhonchi or wheezes     CV: regular rates and rhythm, normal S1 S2, no S3 or S4 and no murmur, click or rub     ABDOMEN:  soft, nontender, no HSM or masses and bowel sounds normal     MS: decreased range of motion right wrist      SKIN: no suspicious lesions or rashes     PSYCH: mentation appears normal. and affect normal/bright     LYMPHATICS: No cervical adenopathy    DIAGNOSTICS:   No labs or EKG required for low risk surgery (cataract, skin procedure, breast biopsy, etc)    No results for input(s): HGB, PLT, INR, NA, POTASSIUM, CR, A1C in the last 09158 hours.     IMPRESSION:   Reason for surgery/procedure: Right wrist tendonitis   Diagnosis/reason for consult: pre op evaluation     The proposed " surgical procedure is considered LOW risk.    REVISED CARDIAC RISK INDEX  The patient has the following serious cardiovascular risks for perioperative complications such as (MI, PE, VFib and 3  AV Block):  No serious cardiac risks  INTERPRETATION: 0 risks: Class I (very low risk - 0.4% complication rate)    The patient has the following additional risks for perioperative complications:  No identified additional risks  The ASCVD Risk score (Ashland YOANDY Jr, et al., 2013) failed to calculate for the following reasons:    Cannot find a previous HDL lab    Cannot find a previous total cholesterol lab      ICD-10-CM    1. Preop general physical exam Z01.818    2. 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 Tenosynovitis, de Quervain M65.4        RECOMMENDATIONS:       Cardiovascular Risk  Performs 4 METs exercise without symptoms (Light housework (dusting, washing dishes)) .       --Patient is to take all scheduled medications on the day of surgery EXCEPT for modifications listed below.    Anticoagulant or Antiplatelet Medication Use  ASPIRIN: Discontinue ASA 7-10 days prior to procedure to reduce bleeding risk.  It should be resumed post-operatively.  NSAIDS: Ibuprofen (Motrin):         Stop one week prior to surgery  Naproxen (Naprosyn):   Stop 1 week prior to surgery        APPROVAL GIVEN to proceed with proposed procedure, without further diagnostic evaluation       Signed Electronically by: Mychal Gusman MD    Copy of this evaluation report is provided to requesting physician.    [facility] Preop Guidelines    Revised Cardiac Risk Index

## 2018-11-20 NOTE — ANESTHESIA CARE TRANSFER NOTE
Patient: Radha Zurita    Procedure(s):  Right Wrist 1st Dorsal Compartment Release    Diagnosis: right wrist dequervains tenosynovitis  Diagnosis Additional Information: No value filed.    Anesthesia Type:   No value filed.     Note:  Airway :Room Air  Patient transferred to:Phase II        Vitals: (Last set prior to Anesthesia Care Transfer)    [facility] VITALS  11/20/2018 1315 - 11/20/2018 1345      11/20/2018             Pulse: 66    SpO2: 99 %                Electronically Signed By: PIPO Zayas CRNA  November 20, 2018  1:45 PM

## 2018-11-20 NOTE — DISCHARGE INSTRUCTIONS
Same Day Surgery Discharge Instructions  Special Precautions After Surgery - Adult    1. It is not unusual to feel lightheaded or faint, up to 24 hours after surgery or while taking pain medication.  If you have these symptoms; sit for a few minutes before standing and have someone assist you when getting up.  2. You should rest and relax for the next 24 hours and must have someone stay with you for at least 24 hours after your discharge.  3. DO NOT DRIVE any vehicle or operate mechanical equipment for 24 hours following the end of your surgery.  DO NOT DRIVE while taking narcotic pain medications that have been prescribed by your physician.  If you had a limb operated on, you must be able to use it fully to drive.  4. DO NOT drink alcoholic beverages for 24 hours following surgery or while taking prescription pain medication.  5. Drink clear liquids (apple juice, ginger ale, broth, 7-Up, etc.).  Progress to your regular diet as you feel able.  6. Any questions call your physician and do not make important decisions for 24 hours.     INCISIONAL CARE  ? Be alert for signs of infection:  redness, swelling, heat, drainage of pus, and/or elevated temperature.  Contact your doctor if these occur.       __________________________________________________________________________________________________________________________________  IMPORTANT NUMBERS:    [facility] Main Number:  [phone], [phone]  Pharmacy:  [phone]  Same Day Surgery:  [phone], Monday - Friday until 8:30 p.m.  Urgent Care:  [phone]  Emergency Room:  [phone]      [facility]:  [phone]                                                                             [facility]:  [phone]         Break through Bleeding  As instructed per Surgeon or Nurse.  If any bleeding or drainage then call your surgeon.     Post Op Infection  Be alert for signs of infection: redness, swelling, heat,  drainage of pus, and/or elevated temperature.  Contact your surgeon if these occur.    Nausea   If post op nausea occurs, at first rest your stomach for a few hours by eating nothing solid and sipping only clear liquids.  Call your Surgeon if nausea does not resolve in 24 hours.

## 2018-11-20 NOTE — IP AVS SNAPSHOT
[facility] PreOP/Phase II    [address] [address]    Phone:  [phone]    Fax:  [phone]                                       After Visit Summary   11/20/2018    Radha Zurita    MRN: [id number]           After Visit Summary Signature Page     I have received my discharge instructions, and my questions have been answered. I have discussed any challenges I see with this plan with the nurse or doctor.    ..........................................................................................................................................  Patient/Patient Representative Signature      ..........................................................................................................................................  Patient Representative Print Name and Relationship to Patient    ..................................................               ................................................  Date                                   Time    ..........................................................................................................................................  Reviewed by Signature/Title    ...................................................              ..............................................  Date                                               Time          22EPIC Rev 08/18

## 2018-11-20 NOTE — IP AVS SNAPSHOT
MRN:[id number]                      After Visit Summary   11/20/2018    Radha Zurita    MRN: [id number]           Thank you!     Thank you for choosing [facility] for your care. Our goal is always to provide you with excellent care. Hearing back from our patients is one way we can continue to improve our services. Please take a few minutes to complete the written survey that you may receive in the mail after you visit with us. Thank you!        Patient Information     Date Of Birth          1950        About your hospital stay     You were admitted on:  November 20, 2018 You last received care in the:  [facility] PreOP/Phase II    You were discharged on:  November 20, 2018       Who to Call     For medical emergencies, please call 911.  For non-urgent questions about your medical care, please call your primary care provider or clinic, None  For questions related to your surgery, please call your surgery clinic        Attending Provider     Provider Michoacano Corey MD Hand Surgery       Primary Care Provider Fax #    Physician No Ref-Primary [phone]      After Care Instructions      Diet as Tolerated       Return to diet before surgery, unless instructed otherwise.            Discharge Instructions       Review outpatient procedure discharge instructions with patient as directed by Provider            Ice to affected area       Ice pack to surgical site every 15 minutes per hour for 24 hours            No Dressing Change       No dressing change until follow up appointment.            Notify Provider       For signs and symptoms of infection: Fever greater than 101, redness, swelling, heat at site, drainage, pus.            Return to clinic       Return to clinic in 2 weeks            Shower        Cover dressing if dressing is not going to be changed today                  Further instructions from your care team                           Same Day Surgery  Discharge Instructions  Special Precautions After Surgery - Adult    1. It is not unusual to feel lightheaded or faint, up to 24 hours after surgery or while taking pain medication.  If you have these symptoms; sit for a few minutes before standing and have someone assist you when getting up.  2. You should rest and relax for the next 24 hours and must have someone stay with you for at least 24 hours after your discharge.  3. DO NOT DRIVE any vehicle or operate mechanical equipment for 24 hours following the end of your surgery.  DO NOT DRIVE while taking narcotic pain medications that have been prescribed by your physician.  If you had a limb operated on, you must be able to use it fully to drive.  4. DO NOT drink alcoholic beverages for 24 hours following surgery or while taking prescription pain medication.  5. Drink clear liquids (apple juice, ginger ale, broth, 7-Up, etc.).  Progress to your regular diet as you feel able.  6. Any questions call your physician and do not make important decisions for 24 hours.     INCISIONAL CARE  ? Be alert for signs of infection:  redness, swelling, heat, drainage of pus, and/or elevated temperature.  Contact your doctor if these occur.       __________________________________________________________________________________________________________________________________  IMPORTANT NUMBERS:    [facility] Main Number:  [phone], [phone]  Pharmacy:  [phone]  Same Day Surgery:  [phone], Monday - Friday until 8:30 p.m.  Urgent Care:  [phone]  Emergency Room:  [phone]      [facility]:  [phone]                                                                             [facility]:  [phone]         Break through Bleeding  As instructed per Surgeon or Nurse.  If any bleeding or drainage then call your surgeon.     Post Op Infection  Be alert for signs of infection: redness, swelling, heat, drainage of pus, and/or elevated  "temperature.  Contact your surgeon if these occur.    Nausea   If post op nausea occurs, at first rest your stomach for a few hours by eating nothing solid and sipping only clear liquids.  Call your Surgeon if nausea does not resolve in 24 hours.    Pending Results     No orders found from 11/18/2018 to 11/21/2018.            Admission Information     Date & Time Provider Department Dept. Phone    11/20/2018 Michoacano Dunn MD [facility] PreOP/Phase [phone]      Your Vitals Were     Blood Pressure Pulse Temperature Respirations Height Weight    99/65 78 98.3  F (36.8  C) (Oral) 16 1.645 m (5' 4.75\") 64 kg (141 lb)    Pulse Oximetry BMI (Body Mass Index)                95% 23.65 kg/m2          MyChart Information     [facility] gives you secure access to your electronic health record. If you see a primary care provider, you can also send messages to your care team and make appointments. If you have questions, please call your primary care clinic.  If you do not have a primary care provider, please call [phone] and they will assist you.        Care EveryWhere ID     This is your Care EveryWhere ID. This could be used by other organizations to access your [place] medical records  [id number]        Equal Access to Services     JUAN FRANCISCO MARIN : Hadfatou cuellaro Sorodger, waaxda luqadaha, qaybta kaalmada adejoanayada, abdi bravo. So [facility] [phone].    ATENCIÓN: Si habla español, tiene a calhoun disposición servicios gratuitos de asistencia lingüística. Llame al [phone].    We comply with applicable federal civil rights laws and [place] laws. We do not discriminate on the basis of race, color, national origin, age, disability, sex, sexual orientation, or gender identity.               Review of your medicines      START taking        Dose / Directions    HYDROcodone-acetaminophen 5-325 MG per tablet   Commonly known as:  NORCO   Used for:  Radial styloid tenosynovitis " (josue burton)        Dose:  1 tablet   Take 1 tablet by mouth every 4 hours as needed for moderate to severe pain   Quantity:  8 tablet   Refills:  0         CONTINUE these medicines which have NOT CHANGED        Dose / Directions    fluticasone-salmeterol 100-50 MCG/DOSE diskus inhaler   Commonly known as:  ADVAIR        Dose:  1 puff   Inhale 1 puff into the lungs every 12 hours   Refills:  0       levothyroxine 50 MCG tablet   Commonly known as:  SYNTHROID/LEVOTHROID   Used for:  Hypothyroidism        Dose:  50 mcg   Take 1 tablet (50 mcg) by mouth daily   Quantity:  90 tablet   Refills:  3       PROAIR  (90 Base) MCG/ACT inhaler   Generic drug:  albuterol        Dose:  2 puff   Inhale 2 puffs into the lungs as needed   Refills:  3            Where to get your medicines      Some of these will need a paper prescription and others can be bought over the counter. Ask your nurse if you have questions.     You don't need a prescription for these medications     HYDROcodone-acetaminophen 5-325 MG per tablet                Protect others around you: Learn how to safely use, store and throw away your medicines at www.disposemymeds.org.        Information about OPIOIDS     PRESCRIPTION OPIOIDS: WHAT YOU NEED TO KNOW   We gave you an opioid (narcotic) pain medicine. It is important to manage your pain, but opioids are not always the best choice. You should first try all the other options your care team gave you. Take this medicine for as short a time (and as few doses) as possible.    Some activities can increase your pain, such as bandage changes or therapy sessions. It may help to take your pain medicine 30 to 60 minutes before these activities. Reduce your stress by getting enough sleep, working on hobbies you enjoy and practicing relaxation or meditation. Talk to your care team about ways to manage your pain beyond prescription opioids.    These medicines have risks:    DO NOT drive when on new or higher doses  of pain medicine. These medicines can affect your alertness and reaction times, and you could be arrested for driving under the influence (DUI). If you need to use opioids long-term, talk to your care team about driving.    DO NOT operate heavy machinery    DO NOT do any other dangerous activities while taking these medicines.    DO NOT drink any alcohol while taking these medicines.     If the opioid prescribed includes acetaminophen, DO NOT take with any other medicines that contain acetaminophen. Read all labels carefully. Look for the word  acetaminophen  or  Tylenol.  Ask your pharmacist if you have questions or are unsure.    You can get addicted to pain medicines, especially if you have a history of addiction (chemical, alcohol or substance dependence). Talk to your care team about ways to reduce this risk.    All opioids tend to cause constipation. Drink plenty of water and eat foods that have a lot of fiber, such as fruits, vegetables, prune juice, apple juice and high-fiber cereal. Take a laxative (Miralax, milk of magnesia, Colace, Senna) if you don t move your bowels at least every other day. Other side effects include upset stomach, sleepiness, dizziness, throwing up, tolerance (needing more of the medicine to have the same effect), physical dependence and slowed breathing.    Store your pills in a secure place, locked if possible. We will not replace any lost or stolen medicine. If you don t finish your medicine, please throw away (dispose) as directed by your pharmacist. The [place] Pollution [employer] has more information about safe disposal: https://www.pca.[facility].[place].us/living-green/managing-unwanted-medications             Medication List: This is a list of all your medications and when to take them. Check marks below indicate your daily home schedule. Keep this list as a reference.      Medications           Morning Afternoon Evening Bedtime As Needed    fluticasone-salmeterol 100-50  MCG/DOSE diskus inhaler   Commonly known as:  ADVAIR   Inhale 1 puff into the lungs every 12 hours                                HYDROcodone-acetaminophen 5-325 MG per tablet   Commonly known as:  NORCO   Take 1 tablet by mouth every 4 hours as needed for moderate to severe pain                                levothyroxine 50 MCG tablet   Commonly known as:  SYNTHROID/LEVOTHROID   Take 1 tablet (50 mcg) by mouth daily                                PROAIR  (90 Base) MCG/ACT inhaler   Inhale 2 puffs into the lungs as needed   Generic drug:  albuterol

## 2018-11-27 ENCOUNTER — TRANSFERRED RECORDS (OUTPATIENT)
Dept: HEALTH INFORMATION MANAGEMENT | Facility: CLINIC | Age: 68
End: 2018-11-27

## 2019-07-10 ENCOUNTER — ALLIED HEALTH/NURSE VISIT (OUTPATIENT)
Dept: FAMILY MEDICINE | Facility: CLINIC | Age: 69
End: 2019-07-10
Payer: COMMERCIAL

## 2019-07-10 DIAGNOSIS — J30.2 SEASONAL ALLERGIC RHINITIS: Primary | ICD-10-CM

## 2019-07-10 PROCEDURE — 99207 ZZC NO CHARGE NURSE ONLY: CPT

## 2019-07-10 NOTE — NURSING NOTE
Patient presented to clinic requesting her Advair be refilled. I checked with [facility] in [place] to make sure she did not have any refills waiting there for her. They state the refills they have on file have . I verified with them the correct dose and checked with patient as well as this is a patient reported medication. I did give one with one refill.    Aubrie Martin RN

## 2019-08-07 ENCOUNTER — OFFICE VISIT (OUTPATIENT)
Dept: FAMILY MEDICINE | Facility: CLINIC | Age: 69
End: 2019-08-07
Payer: COMMERCIAL

## 2019-08-07 VITALS
HEIGHT: 64 IN | OXYGEN SATURATION: 97 % | BODY MASS INDEX: 21.68 KG/M2 | RESPIRATION RATE: 14 BRPM | SYSTOLIC BLOOD PRESSURE: 110 MMHG | TEMPERATURE: 97 F | DIASTOLIC BLOOD PRESSURE: 70 MMHG | WEIGHT: 127 LBS | HEART RATE: 69 BPM

## 2019-08-07 DIAGNOSIS — J30.2 SEASONAL ALLERGIC RHINITIS, UNSPECIFIED TRIGGER: ICD-10-CM

## 2019-08-07 DIAGNOSIS — J45.20 MILD INTERMITTENT EXTRINSIC ASTHMA WITHOUT COMPLICATION: ICD-10-CM

## 2019-08-07 DIAGNOSIS — Z13.220 LIPID SCREENING: ICD-10-CM

## 2019-08-07 DIAGNOSIS — E03.9 HYPOTHYROIDISM, UNSPECIFIED TYPE: Primary | ICD-10-CM

## 2019-08-07 LAB
CHOLEST SERPL-MCNC: 292 MG/DL
HDLC SERPL-MCNC: 92 MG/DL
LDLC SERPL CALC-MCNC: 181 MG/DL
NONHDLC SERPL-MCNC: 200 MG/DL
TRIGL SERPL-MCNC: 95 MG/DL
TSH SERPL DL<=0.005 MIU/L-ACNC: 2.82 MU/L (ref 0.4–4)

## 2019-08-07 PROCEDURE — 99214 OFFICE O/P EST MOD 30 MIN: CPT | Performed by: FAMILY MEDICINE

## 2019-08-07 PROCEDURE — 84443 ASSAY THYROID STIM HORMONE: CPT | Performed by: FAMILY MEDICINE

## 2019-08-07 PROCEDURE — 36415 COLL VENOUS BLD VENIPUNCTURE: CPT | Performed by: FAMILY MEDICINE

## 2019-08-07 PROCEDURE — 80061 LIPID PANEL: CPT | Performed by: FAMILY MEDICINE

## 2019-08-07 RX ORDER — LEVOTHYROXINE SODIUM 50 UG/1
50 TABLET ORAL DAILY
Qty: 90 TABLET | Refills: 3 | Status: SHIPPED | OUTPATIENT
Start: 2019-08-07

## 2019-08-07 ASSESSMENT — MIFFLIN-ST. JEOR: SCORE: 1081.07

## 2019-08-07 NOTE — PATIENT INSTRUCTIONS
Thank you for choosing [facility].  You may be receiving an email and/or telephone survey request from [facility] [employer] regarding your visit today.  Please take a few minutes to respond to the survey to let us know how we are doing.      If you have questions or concerns, please contact us via [facility] or you can contact your care team at [phone].    Our Clinic hours are:  Monday 6:40 am  to 7:00 pm  Tuesday -Friday 6:40 am to 5:00 pm    The [place] outpatient lab hours are:  Monday - Friday 6:10 am to 4:45 pm  Saturdays 7:00 am to 11:00 am  Appointments are required, call [phone]    If you have clinical questions after hours or would like to schedule an appointment,  call the clinic at [phone].

## 2019-08-07 NOTE — PROGRESS NOTES
Subjective     Radha Zurita is a 68 year old female who presents to clinic today for the following health issues:    68 yr old female here for medication refills and labs. Doing okay overall and denies any acute symptoms. She denies any side effects to her medications.    HPI     Chief Complaint   Patient presents with     Thyroid Disease     Refill Request     Blood Draw     patient is fasting        Hypothyroidism Follow-up      Since last visit, patient describes the following symptoms: dry skin and hair loss      Amount of exercise or physical activity: 2-3 days/week for an average of 15-30 minutes    Problems taking medications regularly: No    Medication side effects: none    Diet: weight watcher       Medication Followup of levothyroxine     Taking Medication as prescribed: yes    Side Effects:  None    Medication Helping Symptoms:  yes         Patient Active Problem List   Diagnosis     Seasonal allergic rhinitis     Hypothyroidism     H/O total hysterectomy with bilateral salpingo-oophorectomy (BSO)     S/P knee surgery     Past Surgical History:   Procedure Laterality Date     RELEASE DEQUERVAINS WRIST Right 11/20/2018    Procedure: Right Wrist 1st Dorsal Compartment Release;  Surgeon: Michoacano Dunn MD;  Location: [facility] OR       Social History     Tobacco Use     Smoking status: Former Smoker     Smokeless tobacco: Never Used   Substance Use Topics     Alcohol use: Not Currently     History reviewed. No pertinent family history.      Current Outpatient Medications   Medication Sig Dispense Refill     fluticasone-salmeterol (ADVAIR) 100-50 MCG/DOSE inhaler Inhale 1 puff into the lungs every 12 hours 1 Inhaler 6     levothyroxine (SYNTHROID/LEVOTHROID) 50 MCG tablet Take 1 tablet (50 mcg) by mouth daily 90 tablet 3     PROAIR  (90 Base) MCG/ACT inhaler Inhale 2 puffs into the lungs as needed  3     Allergies   Allergen Reactions     Cats      Dogs      Seasonal Allergies      BP Readings  "from Last 3 Encounters:   08/07/19 110/70   11/20/18 117/78   10/23/18 106/64    Wt Readings from Last 3 Encounters:   08/07/19 57.6 kg (127 lb)   11/20/18 64 kg (141 lb)   10/23/18 64 kg (141 lb)                      Reviewed and updated as needed this visit by Provider  Tobacco  Allergies  Meds  Problems  Med Hx  Surg Hx  Fam Hx         Review of Systems   ROS COMP: Constitutional, HEENT, cardiovascular, pulmonary, gi and gu systems are negative, except as otherwise noted.      Objective    /70   Pulse 69   Temp 97  F (36.1  C) (Tympanic)   Resp 14   Ht 1.626 m (5' 4\")   Wt 57.6 kg (127 lb)   SpO2 97%   BMI 21.80 kg/m    Body mass index is 21.8 kg/m .  Physical Exam   GENERAL: healthy, alert and no distress  EYES: Eyes grossly normal to inspection, PERRL and conjunctivae and sclerae normal  HENT: ear canals and TM's normal, nose and mouth without ulcers or lesions  NECK: no adenopathy, no asymmetry, masses, or scars and thyroid normal to palpation  RESP: lungs clear to auscultation - no rales, rhonchi or wheezes  CV: regular rate and rhythm, normal S1 S2, no S3 or S4, no murmur, click or rub, no peripheral edema and peripheral pulses strong  ABDOMEN: soft, nontender, no hepatosplenomegaly, no masses and bowel sounds normal  MS: no gross musculoskeletal defects noted, no edema    Diagnostic Test Results:  Pending         Assessment & Plan     1. Hypothyroidism, unspecified type  Medication refilled . Labs pending .  - levothyroxine (SYNTHROID/LEVOTHROID) 50 MCG tablet; Take 1 tablet (50 mcg) by mouth daily  Dispense: 90 tablet; Refill: 3  - TSH with free T4 reflex    2. Lipid screening  Patient will be notified of results  - Lipid panel reflex to direct LDL Fasting    3. Seasonal allergic rhinitis  Stable symptoms     4. Mild intermittent extrinsic asthma without complication  Medication refilled   - fluticasone-salmeterol (ADVAIR) 100-50 MCG/DOSE inhaler; Inhale 1 puff into the lungs every 12 " hours  Dispense: 1 Inhaler; Refill: 6       FUTURE APPOINTMENTS:       - Follow-up visit in one year or sooner as needed.  Patient Instructions         Thank you for choosing [facility].  You may be receiving an email and/or telephone survey request from [facility] Customer Experience regarding your visit today.  Please take a few minutes to respond to the survey to let us know how we are doing.      If you have questions or concerns, please contact us via [employer] or you can contact your care team at [phone].    Our Clinic hours are:  Monday 6:40 am  to 7:00 pm  Tuesday -Friday 6:40 am to 5:00 pm    The [place] outpatient lab hours are:  Monday - Friday 6:10 am to 4:45 pm  Saturdays 7:00 am to 11:00 am  Appointments are required, call [phone]    If you have clinical questions after hours or would like to schedule an appointment,  call the clinic at [phone].      No follow-ups on file.    Mychal Gusman MD  [facility] [place]

## 2019-08-08 ENCOUNTER — MYC MEDICAL ADVICE (OUTPATIENT)
Dept: FAMILY MEDICINE | Facility: CLINIC | Age: 69
End: 2019-08-08

## 2019-08-08 DIAGNOSIS — E78.5 HYPERLIPIDEMIA LDL GOAL <100: Primary | ICD-10-CM

## 2019-08-08 DIAGNOSIS — Z13.220 LIPID SCREENING: Primary | ICD-10-CM

## 2019-08-08 RX ORDER — ATORVASTATIN CALCIUM 10 MG/1
10 TABLET, FILM COATED ORAL DAILY
Qty: 90 TABLET | Refills: 3 | Status: SHIPPED | OUTPATIENT
Start: 2019-08-08 | End: 2024-05-31

## 2019-08-08 ASSESSMENT — ASTHMA QUESTIONNAIRES: ACT_TOTALSCORE: 24

## 2019-08-08 NOTE — RESULT ENCOUNTER NOTE
Please inform patient that test result ( thyroid )  was within normal parameters. Her cholesterol was quite elevated.  She is at mild to moderate risk for cardiovascular events . She will benefit from a Statin. We will recheck in six months .  Thank you.     Mychal Gusman M.D.

## 2019-08-12 DIAGNOSIS — Z13.220 LIPID SCREENING: ICD-10-CM

## 2019-08-12 LAB
CHOLEST SERPL-MCNC: 282 MG/DL
HDLC SERPL-MCNC: 94 MG/DL
LDLC SERPL CALC-MCNC: 170 MG/DL
NONHDLC SERPL-MCNC: 188 MG/DL
TRIGL SERPL-MCNC: 90 MG/DL

## 2019-08-12 PROCEDURE — 80061 LIPID PANEL: CPT | Performed by: FAMILY MEDICINE

## 2019-08-12 PROCEDURE — 36415 COLL VENOUS BLD VENIPUNCTURE: CPT | Performed by: FAMILY MEDICINE

## 2019-08-14 PROBLEM — J45.20 MILD INTERMITTENT EXTRINSIC ASTHMA WITHOUT COMPLICATION: Status: ACTIVE | Noted: 2019-08-14

## 2019-08-20 ENCOUNTER — OFFICE VISIT (OUTPATIENT)
Dept: FAMILY MEDICINE | Facility: CLINIC | Age: 69
End: 2019-08-20
Payer: COMMERCIAL

## 2019-08-20 VITALS
OXYGEN SATURATION: 98 % | TEMPERATURE: 96 F | HEIGHT: 64 IN | WEIGHT: 127 LBS | HEART RATE: 64 BPM | SYSTOLIC BLOOD PRESSURE: 100 MMHG | DIASTOLIC BLOOD PRESSURE: 70 MMHG | RESPIRATION RATE: 14 BRPM | BODY MASS INDEX: 21.68 KG/M2

## 2019-08-20 DIAGNOSIS — E78.5 HYPERLIPIDEMIA LDL GOAL <100: Primary | ICD-10-CM

## 2019-08-20 PROCEDURE — 99213 OFFICE O/P EST LOW 20 MIN: CPT | Performed by: FAMILY MEDICINE

## 2019-08-20 ASSESSMENT — MIFFLIN-ST. JEOR: SCORE: 1081.07

## 2019-08-20 NOTE — PROGRESS NOTES
Subjective     Radha Zurita is a 68 year old female who presents to clinic today for the following health issues:  68 yr old female who had recent cholesterol labs, has moderate cholesterol. Quite reluctant to start taking cholesterol medication and will like to try lifestyle modification before considering medication. Risk of cardiovascular event is 6 % she is at low to moderate risk of cardiovascular events. Discussed this with patient .    The 10-year ASCVD risk score (Valentine PITT Jr., et al., 2013) is: 6%    Values used to calculate the score:      Age: 68 years      Sex: Female      Is Non- : No      Diabetic: No      Tobacco smoker: No      Systolic Blood Pressure: 100 mmHg      Is BP treated: No      HDL Cholesterol: 94 mg/dL      Total Cholesterol: 282 mg/dL      HPI   Hyperlipidemia Follow-Up      Are you having any of the following symptoms? (Select all that apply)  No complaints of shortness of breath, chest pain or pressure.  No increased sweating or nausea with activity.  No left-sided neck or arm pain.  No complaints of pain in calves when walking 1-2 blocks.    Are you regularly taking any medication or supplement to lower your cholesterol?   No    Are you having muscle aches or other side effects that you think could be caused by your cholesterol lowering medication?  No          How many servings of fruits and vegetables do you eat daily?  4 or more    On average, how many sweetened beverages do you drink each day (soda, juice, sweet tea, etc)?   0  How many days per week do you miss taking your medication? Patient is not taking medication would like to discuss alternative     What makes it hard for you to take your medications?  has a plan to try something different than medication             Patient Active Problem List   Diagnosis     Seasonal allergic rhinitis     Hypothyroidism     H/O total hysterectomy with bilateral salpingo-oophorectomy (BSO)     S/P knee surgery  "    Mild intermittent extrinsic asthma without complication     Past Surgical History:   Procedure Laterality Date     RELEASE DEQUERVAINS WRIST Right 11/20/2018    Procedure: Right Wrist 1st Dorsal Compartment Release;  Surgeon: Michoacano Dunn MD;  Location: [facility] OR       Social History     Tobacco Use     Smoking status: Former Smoker     Smokeless tobacco: Never Used   Substance Use Topics     Alcohol use: Not Currently     History reviewed. No pertinent family history.      Current Outpatient Medications   Medication Sig Dispense Refill     levothyroxine (SYNTHROID/LEVOTHROID) 50 MCG tablet Take 1 tablet (50 mcg) by mouth daily 90 tablet 3     atorvastatin (LIPITOR) 10 MG tablet Take 1 tablet (10 mg) by mouth daily (Patient not taking: Reported on 8/20/2019) 90 tablet 3     fluticasone-salmeterol (ADVAIR) 100-50 MCG/DOSE inhaler Inhale 1 puff into the lungs every 12 hours 1 Inhaler 6     PROAIR  (90 Base) MCG/ACT inhaler Inhale 2 puffs into the lungs as needed  3     Allergies   Allergen Reactions     Cats      Dogs      Seasonal Allergies      BP Readings from Last 3 Encounters:   08/20/19 100/70   08/07/19 110/70   11/20/18 117/78    Wt Readings from Last 3 Encounters:   08/20/19 57.6 kg (127 lb)   08/07/19 57.6 kg (127 lb)   11/20/18 64 kg (141 lb)                      Reviewed and updated as needed this visit by Provider  Tobacco  Allergies  Meds  Problems  Med Hx  Surg Hx  Fam Hx         Review of Systems   ROS COMP: Constitutional, HEENT, cardiovascular, pulmonary, gi and gu systems are negative, except as otherwise noted.      Objective    /70   Pulse 64   Temp 96  F (35.6  C) (Tympanic)   Resp 14   Ht 1.626 m (5' 4\")   Wt 57.6 kg (127 lb)   SpO2 98%   BMI 21.80 kg/m    Body mass index is 21.8 kg/m .  Physical Exam   GENERAL: healthy, alert and no distress  EYES: Eyes grossly normal to inspection, PERRL and conjunctivae and sclerae normal  HENT: ear canals and TM's normal, " nose and mouth without ulcers or lesions  NECK: no adenopathy, no asymmetry, masses, or scars and thyroid normal to palpation  RESP: lungs clear to auscultation - no rales, rhonchi or wheezes  CV: regular rate and rhythm, normal S1 S2, no S3 or S4, no murmur, click or rub, no peripheral edema and peripheral pulses strong  MS: no gross musculoskeletal defects noted, no edema    Diagnostic Test Results:  Results for orders placed or performed in visit on 08/12/19   Lipid panel reflex to direct LDL Fasting   Result Value Ref Range    Cholesterol 282 (H) <200 mg/dL    Triglycerides 90 <150 mg/dL    HDL Cholesterol 94 >49 mg/dL    LDL Cholesterol Calculated 170 (H) <100 mg/dL    Non HDL Cholesterol 188 (H) <130 mg/dL           Assessment & Plan     1. Hyperlipidemia LDL goal <100  Patient will like to try lifestyle modification for six months and come back to recheck her labs after this. In the meantime she plans to exercise more and also eat right   - Lipid panel reflex to direct LDL Fasting; Future       FUTURE APPOINTMENTS:       - Follow-up visit in 6 months for lab work     Return in about 6 months (around 2/20/2020) for Lab Work.    Mychal Gusman MD  [facility] [place]

## 2019-08-20 NOTE — PATIENT INSTRUCTIONS
Thank you for choosing [facility].  You may be receiving an email and/or telephone survey request from [facility] Customer Experience regarding your visit today.  Please take a few minutes to respond to the survey to let us know how we are doing.      If you have questions or concerns, please contact us via [employer] or you can contact your care team at [phone].    Our Clinic hours are:  Monday 6:40 am  to 7:00 pm  Tuesday -Friday 6:40 am to 5:00 pm    The [place] outpatient lab hours are:  Monday - Friday 6:10 am to 4:45 pm  Saturdays 7:00 am to 11:00 am  Appointments are required, call [phone]    If you have clinical questions after hours or would like to schedule an appointment,  call the clinic at [phone].

## 2019-08-21 ASSESSMENT — ASTHMA QUESTIONNAIRES: ACT_TOTALSCORE: 23

## 2019-11-21 ENCOUNTER — TRANSFERRED RECORDS (OUTPATIENT)
Dept: HEALTH INFORMATION MANAGEMENT | Facility: CLINIC | Age: 69
End: 2019-11-21

## 2019-12-04 ENCOUNTER — TRANSFERRED RECORDS (OUTPATIENT)
Dept: HEALTH INFORMATION MANAGEMENT | Facility: CLINIC | Age: 69
End: 2019-12-04

## 2020-01-03 ENCOUNTER — TRANSFERRED RECORDS (OUTPATIENT)
Dept: HEALTH INFORMATION MANAGEMENT | Facility: CLINIC | Age: 70
End: 2020-01-03

## 2020-01-24 ENCOUNTER — TRANSFERRED RECORDS (OUTPATIENT)
Dept: HEALTH INFORMATION MANAGEMENT | Facility: CLINIC | Age: 70
End: 2020-01-24

## 2020-01-28 ENCOUNTER — TRANSFERRED RECORDS (OUTPATIENT)
Dept: HEALTH INFORMATION MANAGEMENT | Facility: CLINIC | Age: 70
End: 2020-01-28

## 2020-02-04 ENCOUNTER — TRANSFERRED RECORDS (OUTPATIENT)
Dept: HEALTH INFORMATION MANAGEMENT | Facility: CLINIC | Age: 70
End: 2020-02-04

## 2020-02-07 ENCOUNTER — TRANSFERRED RECORDS (OUTPATIENT)
Dept: HEALTH INFORMATION MANAGEMENT | Facility: CLINIC | Age: 70
End: 2020-02-07

## 2020-02-11 ENCOUNTER — TRANSFERRED RECORDS (OUTPATIENT)
Dept: HEALTH INFORMATION MANAGEMENT | Facility: CLINIC | Age: 70
End: 2020-02-11

## 2020-02-19 ENCOUNTER — TRANSFERRED RECORDS (OUTPATIENT)
Dept: HEALTH INFORMATION MANAGEMENT | Facility: CLINIC | Age: 70
End: 2020-02-19

## 2020-02-23 ENCOUNTER — HEALTH MAINTENANCE LETTER (OUTPATIENT)
Age: 70
End: 2020-02-23

## 2020-03-11 ENCOUNTER — TRANSFERRED RECORDS (OUTPATIENT)
Dept: HEALTH INFORMATION MANAGEMENT | Facility: CLINIC | Age: 70
End: 2020-03-11

## 2020-12-12 ENCOUNTER — HEALTH MAINTENANCE LETTER (OUTPATIENT)
Age: 70
End: 2020-12-12

## 2021-01-04 ENCOUNTER — TRANSFERRED RECORDS (OUTPATIENT)
Dept: HEALTH INFORMATION MANAGEMENT | Facility: CLINIC | Age: 71
End: 2021-01-04

## 2021-04-11 ENCOUNTER — HEALTH MAINTENANCE LETTER (OUTPATIENT)
Age: 71
End: 2021-04-11

## 2021-09-26 ENCOUNTER — HEALTH MAINTENANCE LETTER (OUTPATIENT)
Age: 71
End: 2021-09-26

## 2021-10-08 ENCOUNTER — VIRTUAL VISIT (OUTPATIENT)
Dept: FAMILY MEDICINE | Facility: CLINIC | Age: 71
End: 2021-10-08
Payer: COMMERCIAL

## 2021-10-08 DIAGNOSIS — J45.20 MILD INTERMITTENT EXTRINSIC ASTHMA WITHOUT COMPLICATION: Primary | ICD-10-CM

## 2021-10-08 PROCEDURE — 99441 PR PHYSICIAN TELEPHONE EVALUATION 5-10 MIN: CPT | Performed by: NURSE PRACTITIONER

## 2021-10-08 RX ORDER — ALBUTEROL SULFATE 90 UG/1
2 AEROSOL, METERED RESPIRATORY (INHALATION) EVERY 4 HOURS PRN
Qty: 18 G | Refills: 4 | Status: SHIPPED | OUTPATIENT
Start: 2021-10-08

## 2021-10-08 NOTE — PROGRESS NOTES
Radha is a 71 year old who is being evaluated via a billable telephone visit.      What phone number would you like to be contacted at? [phone]  How would you like to obtain your AVS? MyChart    Assessment & Plan     Mild intermittent extrinsic asthma without complication  Is typical for patient to have asthma symptoms exacerbated by allergies. Does not like to use Advair as she typically only has symptoms for several weeks in the fall while in [place]. Medication refilled. Will let me know if things are not improving.  - PROAIR  (90 Base) MCG/ACT inhaler; Inhale 2 puffs into the lungs every 4 hours as needed for shortness of breath / dyspnea or wheezing       Patient Instructions   Albuterol is refilled. Let me know if things are not improving.      Return in about 1 week (around 10/15/2021) for worsening or continued symptoms.    [facility]    Subjective   Radha is a 71 year old who presents for the following health issues     HPI     Asthma Follow-Up    Pt is going back to [place] on Monday, but seasonal allergies when pt is in [place]. Would like a refill albuterol inhaler.     Was ACT completed today?  No      Do you have a cough?  YES    Are you experiencing any wheezing in your chest?  YES    Do you have any shortness of breath?  YES     How often are you using a short-acting (rescue) inhaler or nebulizer, such as Albuterol?  Daily 1-2 times, feeling as if need more but running low     How many days per week do you miss taking your asthma controller medication?  I do not have an asthma controller medication    Please describe any recent triggers for your asthma: pollens and seasonal allergies    Have you had any Emergency Room Visits, Urgent Care Visits, or Hospital Admissions since your last office visit?  No      How many servings of fruits and vegetables do you eat daily?  2-3    On average, how many sweetened beverages do you drink each  day (Examples: soda, juice, sweet tea, etc.  Do NOT count diet or artificially sweetened beverages)?   0    How many days per week do you exercise enough to make your heart beat faster? 7    How many minutes a day do you exercise enough to make your heart beat faster? 20 - 29    How many days per week do you miss taking your medication? 0    Above HPI reviewed. Additionally, no chest pain, fevers, URI symptoms. Typical for her to have allergy/asthma symptoms in fall while in [place].         Review of Systems   Constitutional, HEENT, cardiovascular, pulmonary, gi and gu systems are negative, except as otherwise noted.      Objective           Vitals:  No vitals were obtained today due to virtual visit.    Physical Exam   healthy, alert and no distress  PSYCH: Alert and oriented times 3; coherent speech, normal   rate and volume, able to articulate logical thoughts, able   to abstract reason, no tangential thoughts, no hallucinations   or delusions  Her affect is normal  RESP: No cough, no audible wheezing, able to talk in full sentences  Remainder of exam unable to be completed due to telephone visits                Phone call duration: 5 minutes

## 2022-03-13 ENCOUNTER — HEALTH MAINTENANCE LETTER (OUTPATIENT)
Age: 72
End: 2022-03-13

## 2022-05-08 ENCOUNTER — HEALTH MAINTENANCE LETTER (OUTPATIENT)
Age: 72
End: 2022-05-08

## 2023-01-08 ENCOUNTER — HEALTH MAINTENANCE LETTER (OUTPATIENT)
Age: 73
End: 2023-01-08

## 2023-06-02 ENCOUNTER — HEALTH MAINTENANCE LETTER (OUTPATIENT)
Age: 73
End: 2023-06-02

## 2024-05-14 ENCOUNTER — OFFICE VISIT (OUTPATIENT)
Dept: ORTHOPEDICS | Facility: CLINIC | Age: 74
End: 2024-05-14
Payer: COMMERCIAL

## 2024-05-14 ENCOUNTER — ANCILLARY PROCEDURE (OUTPATIENT)
Dept: GENERAL RADIOLOGY | Facility: CLINIC | Age: 74
End: 2024-05-14
Attending: FAMILY MEDICINE
Payer: COMMERCIAL

## 2024-05-14 DIAGNOSIS — M25.569 KNEE PAIN: ICD-10-CM

## 2024-05-14 DIAGNOSIS — M25.569 KNEE PAIN: Primary | ICD-10-CM

## 2024-05-14 DIAGNOSIS — M25.562 ACUTE PAIN OF LEFT KNEE: Primary | ICD-10-CM

## 2024-05-14 PROCEDURE — 73562 X-RAY EXAM OF KNEE 3: CPT | Mod: LT | Performed by: RADIOLOGY

## 2024-05-14 PROCEDURE — 99203 OFFICE O/P NEW LOW 30 MIN: CPT | Mod: GC | Performed by: FAMILY MEDICINE

## 2024-05-14 NOTE — PROGRESS NOTES
CHIEF COMPLAINT:  Pain of the Left Knee       HISTORY OF PRESENT ILLNESS  Ms. Jere Zurita is a pleasant 73 year old year old female who presents to clinic today withleft knee pain.  Radha explains that on Mother'sday she was sitting in a chair on a deck and fell backwards off the deck, about 2 steps. She didn't have much leg pain at the time, but does have some bruising. Then yesterday she was in the yard spraying for mosquitos and thinks she twisted the knee and fell. Did hear a pop with twisting. Since then she has been unable to bear weight on the left leg. Pain is located over the posterior aspect of the knee. Denies any swelling. Pain is worse with weightbearing activities. Her neighbor has crutches that she has been using to ambulate. Hx of left knee arthroscopy over 10 years ago.     Onset: sudden  Location: left knee  Quality:  aching  Duration: 1 days   Severity: /10 at worst  Timing:constant  Modifying factors:  resting and non-use makes it better, movement and use makes it worse  Associated signs & symptoms: pain  Previous similar pain: No  Treatments to date:Crutches and ibuprofen     Additional history: as documented    Review of Systems:  Have you recently had a a fever, chills, weight loss? No  Do you have any vision problems? No  Do you have any chest pain or edema? No  Do you have any shortness of breath or wheezing?  No  Do you have stomach problems? No  Do you have any numbness or focal weakness? No  Do you have diabetes? No  Do you have problems with bleeding or clotting? No  Do you have an rashes or other skin lesions? No    MEDICAL HISTORY  Patient Active Problem List   Diagnosis    Seasonal allergic rhinitis    Hypothyroidism    H/O total hysterectomy with bilateral salpingo-oophorectomy (BSO)    S/P knee surgery    Mild intermittent extrinsic asthma without complication       Current Outpatient Medications   Medication Sig Dispense Refill    atorvastatin (LIPITOR) 10 MG tablet Take 1 tablet  (10 mg) by mouth daily (Patient not taking: Reported on 8/20/2019) 90 tablet 3    fluticasone-salmeterol (ADVAIR) 100-50 MCG/DOSE inhaler Inhale 1 puff into the lungs every 12 hours (Patient not taking: Reported on 10/8/2021) 1 Inhaler 6    levothyroxine (SYNTHROID/LEVOTHROID) 50 MCG tablet Take 1 tablet (50 mcg) by mouth daily 90 tablet 3    PROAIR  (90 Base) MCG/ACT inhaler Inhale 2 puffs into the lungs every 4 hours as needed for shortness of breath / dyspnea or wheezing 18 g 4       Allergies   Allergen Reactions    Cats     Dogs     Seasonal Allergies        No family history on file.    Additional medical/Social/Surgical histories reviewed in [facility] and updated as appropriate.       PHYSICAL EXAM  There were no vitals taken for this visit.    General: AOX4, NAD  Musculoskeletal: Left knee  Inspection: no visual deformities, significant edema and effusion limiting exam. Guarding knee.  ROM: unable to fully extend without pain, flexion to about 60 degrees with significant pain and discomfort  Palpation: Tenderness to palpation diffusely along joint line, medial tibial plateau, lateral patella  Unable to assess anterior drawer, posterior drawer, or Lachmann's due to significant pain and guarding  Negative varus, valgus testing, though notably   Ian test positive    IMAGING : X-ray knee 3 views. Final results and radiologist's interpretation, available in the [facility] health record. Images were reviewed with the patient/family members in the office today. My personal interpretation of the performed imaging is moderate osteoarthritic changes of patellofemoral compartment.  Mild degenerative changes of medial compartment.     ASSESSMENT & PLAN  Ms. Jere Zurita is a 73 year old year old female who presents to clinic today with acute left knee pain that began abruptly while walking in her yard.    Concern at this time for possible osteoarthritis exacerbation, discussed consideration for degenerative meniscal  tear which may be present as well.  No evidence today to suggest ligamentous injury.  No sign of acute fracture patellar dislocation.  X-ray shows degenerative changes, however no acute findings.    Diagnosis:   1.  Acute pain of left knee  2.  Primary osteoarthritis of left knee      -Provide hinged knee brace, recommend that she continue with crutches, weight bearing as tolerated and can progress to toe-touch weightbearing as tolerated  - can continue with ibuprofen, Tylenol, ice for pain control  -May use knee sleeve when resting  -Follow up 1 week for re-evaluation and repeat examination, if there is no considerable improvement I would then consider an MRI of her left knee.    It was a pleasure seeing Radha today.    Suri Patel MD, PGY-2    I saw Radha OTONIEL Zurita with the resident Suri Patel MD, PGY2 and agree with the resident's findings and plan of care as documented in the resident's note.  Will start with rest, ice, compression, elevation and bracing.  This is an osteoarthritic flare with degenerative meniscus tear I would then anticipate rapid improvement over the next week.  If no improvement may consider advanced imaging.

## 2024-05-14 NOTE — LETTER
5/14/2024      RE: Radha Zurita  [address] [facility] [address]     Dear Colleague,    Thank you for referring your patient, Radha Zurita, to the [facility] SP[facility]TS MEDIC[facility] CLINIC [place]. Please see a copy of my visit note below.    CHIEF COMPLAINT:  Pain of the Left Knee       HISTORY OF PRESENT ILLNESS  Ms. Jere Zurita is a pleasant 73 year old year old female who presents to clinic today withleft knee pain.  Radha explains that on Mother'sday she was sitting in a chair on a deck and fell backwards off the deck, about 2 steps. She didn't have much leg pain at the time, but does have some bruising. Then yesterday she was in the yard spraying for mosquitos and thinks she twisted the knee and fell. Did hear a pop with twisting. Since then she has been unable to bear weight on the left leg. Pain is located over the posterior aspect of the knee. Denies any swelling. Pain is worse with weightbearing activities. Her neighbor has crutches that she has been using to ambulate. Hx of left knee arthroscopy over 10 years ago.     Onset: sudden  Location: left knee  Quality:  aching  Duration: 1 days   Severity: /10 at worst  Timing:constant  Modifying factors:  resting and non-use makes it better, movement and use makes it worse  Associated signs & symptoms: pain  Previous similar pain: No  Treatments to date:Crutches and ibuprofen     Additional history: as documented    Review of Systems:  Have you recently had a a fever, chills, weight loss? No  Do you have any vision problems? No  Do you have any chest pain or edema? No  Do you have any shortness of breath or wheezing?  No  Do you have stomach problems? No  Do you have any numbness or focal weakness? No  Do you have diabetes? No  Do you have problems with bleeding or clotting? No  Do you have an rashes or other skin lesions? No    MEDICAL HISTORY  Patient Active Problem List   Diagnosis     Seasonal allergic rhinitis      Hypothyroidism     H/O total hysterectomy with bilateral salpingo-oophorectomy (BSO)     S/P knee surgery     Mild intermittent extrinsic asthma without complication       Current Outpatient Medications   Medication Sig Dispense Refill     atorvastatin (LIPITOR) 10 MG tablet Take 1 tablet (10 mg) by mouth daily (Patient not taking: Reported on 8/20/2019) 90 tablet 3     fluticasone-salmeterol (ADVAIR) 100-50 MCG/DOSE inhaler Inhale 1 puff into the lungs every 12 hours (Patient not taking: Reported on 10/8/2021) 1 Inhaler 6     levothyroxine (SYNTHROID/LEVOTHROID) 50 MCG tablet Take 1 tablet (50 mcg) by mouth daily 90 tablet 3     PROAIR  (90 Base) MCG/ACT inhaler Inhale 2 puffs into the lungs every 4 hours as needed for shortness of breath / dyspnea or wheezing 18 g 4       Allergies   Allergen Reactions     Cats      Dogs      Seasonal Allergies        No family history on file.    Additional medical/Social/Surgical histories reviewed in [facility] and updated as appropriate.       PHYSICAL EXAM  There were no vitals taken for this visit.    General: AOX4, NAD  Musculoskeletal: Left knee  Inspection: no visual deformities, significant edema and effusion limiting exam. Guarding knee.  ROM: unable to fully extend without pain, flexion to about 60 degrees with significant pain and discomfort  Palpation: Tenderness to palpation diffusely along joint line, medial tibial plateau, lateral patella  Unable to assess anterior drawer, posterior drawer, or Lachmann's due to significant pain and guarding  Negative varus, valgus testing, though notably   Ian test positive    IMAGING : X-ray knee 3 views. Final results and radiologist's interpretation, available in the [facility] health record. Images were reviewed with the patient/family members in the office today. My personal interpretation of the performed imaging is moderate osteoarthritic changes of patellofemoral compartment.  Mild degenerative changes of medial  compartment.     ASSESSMENT & PLAN  Ms. Jere Zurita is a 73 year old year old female who presents to clinic today with acute left knee pain that began abruptly while walking in her yard.    Concern at this time for possible osteoarthritis exacerbation, discussed consideration for degenerative meniscal tear which may be present as well.  No evidence today to suggest ligamentous injury.  No sign of acute fracture patellar dislocation.  X-ray shows degenerative changes, however no acute findings.    Diagnosis:   1.  Acute pain of left knee  2.  Primary osteoarthritis of left knee      -Provide hinged knee brace, recommend that she continue with crutches, weight bearing as tolerated and can progress to toe-touch weightbearing as tolerated  - can continue with ibuprofen, Tylenol, ice for pain control  -May use knee sleeve when resting  -Follow up 1 week for re-evaluation and repeat examination, if there is no considerable improvement I would then consider an MRI of her left knee.    It was a pleasure seeing Radha today.    Suri Patel MD, PGY-2    I saw Radha Zurita with the resident Suri Patel MD, PGY2 and agree with the resident's findings and plan of care as documented in the resident's note.  Will start with rest, ice, compression, elevation and bracing.  This is an osteoarthritic flare with degenerative meniscus tear I would then anticipate rapid improvement over the next week.  If no improvement may consider advanced imaging.      Again, thank you for allowing me to participate in the care of your patient.      Sincerely,    Vick Lozada, DO

## 2024-05-21 ENCOUNTER — HOSPITAL ENCOUNTER (OUTPATIENT)
Dept: MRI IMAGING | Facility: CLINIC | Age: 74
Discharge: HOME | End: 2024-05-21
Attending: FAMILY MEDICINE | Admitting: FAMILY MEDICINE
Payer: COMMERCIAL

## 2024-05-21 ENCOUNTER — OFFICE VISIT (OUTPATIENT)
Dept: ORTHOPEDICS | Facility: CLINIC | Age: 74
End: 2024-05-21
Payer: COMMERCIAL

## 2024-05-21 DIAGNOSIS — M17.12 PRIMARY OSTEOARTHRITIS OF LEFT KNEE: ICD-10-CM

## 2024-05-21 DIAGNOSIS — M23.92 ACUTE INTERNAL DERANGEMENT OF LEFT KNEE: Primary | ICD-10-CM

## 2024-05-21 DIAGNOSIS — M23.92 ACUTE INTERNAL DERANGEMENT OF LEFT KNEE: ICD-10-CM

## 2024-05-21 PROCEDURE — 73721 MRI JNT OF LWR EXTRE W/O DYE: CPT | Mod: LT

## 2024-05-21 PROCEDURE — 99213 OFFICE O/P EST LOW 20 MIN: CPT | Performed by: FAMILY MEDICINE

## 2024-05-21 NOTE — LETTER
5/21/2024      RE: Radha Zurita  [address]  [facility] [address]     Dear Colleague,    Thank you for referring your patient, Radha Zurita, to the [facility]ICINE CLIN[facility] [place]. Please see a copy of my visit note below.    ESTABLISHED PATIENT FOLLOW-UP:  Pain of the Left Knee     HISTORY OF PRESENT ILLNESS  Ms. Jere Zurita is a pleasant 73 year old year old female who presents to clinic today for follow-up of left knee pain.    Date of injury: 5/13/24  Date last seen: 5/24/24  Following Therapeutic Plan: Yes   Pain: Improved slightly   Function: Improved Slightly   Interval History: Pain is still there, able to toe touch but still having throbbing pain. Still using single or both crutches at all time. Pain is very diffusive.      Additional medical/Social/Surgical histories reviewed in [facility] and updated as appropriate.    REVIEW OF SYSTEMS (5/21/2024)  CONSTITUTIONAL: Denies fever and weight loss  GASTROINTESTINAL: Denies abdominal pain, nausea, vomiting  MUSCULOSKELETAL: See HPI  SKIN: Denies any recent rash or lesion  NEUROLOGICAL: Denies numbness or focal weakness     PHYSICAL EXAM  There were no vitals taken for this visit.    General: AOX4, NAD  Musculoskeletal: Left knee  Inspection: no visual deformities, significant edema and effusion limiting exam. Guarding knee.  ROM: unable to fully extend without pain, flexion to about 60 degrees with significant pain and discomfort  Palpation: Tenderness to palpation diffusely along joint line, medial tibial plateau, lateral patella  Unable to assess anterior drawer, posterior drawer, or Lachmann's due to significant pain and guarding  Negative varus, valgus testing, though notably   [facility] test positive    IMAGING :   XR knee left 3 views 5/14/24  Impression:  1. No acute osseous abnormality.  2. Mild to moderate left knee degenerative change most pronounced in  the patellofemoral compartment.     I have  personally reviewed the examination and initial interpretation  and I agree with the findings.     DEL ADKINS MD      ASSESSMENT & PLAN  Ms. Jere Zurita is a 73 year old year old female who presents to clinic today with Pain of the Left Knee    Mild to moderate degenerative changes on xrays may be playing a role with OA exacerbation however inability to bear weight since injury is uncharacteristic for DJD. Concern for insufficiency fracture, possibly degenerative or acute meniscal derangement, and ligamentous injury    -Continue WBAT with crutches  -Hinged knee bracing, ice, elevation  -MRI left knee without contrast ordered today stat  -Follow up: Virtual video or telephone to discuss after MRI is completed    It was a pleasure seeing Abby Lozada DO, CAQSM  Primary Care Sports Medicine

## 2024-05-21 NOTE — PROGRESS NOTES
ESTABLISHED PATIENT FOLLOW-UP:  Pain of the Left Knee     HISTORY OF PRESENT ILLNESS  Ms. Jere Zurita is a pleasant 73 year old year old female who presents to clinic today for follow-up of left knee pain.    Date of injury: 5/13/24  Date last seen: 5/24/24  Following Therapeutic Plan: Yes   Pain: Improved slightly   Function: Improved Slightly   Interval History: Pain is still there, able to toe touch but still having throbbing pain. Still using single or both crutches at all time. Pain is very diffusive.      Additional medical/Social/Surgical histories reviewed in [facility] and updated as appropriate.    REVIEW OF SYSTEMS (5/21/2024)  CONSTITUTIONAL: Denies fever and weight loss  GASTROINTESTINAL: Denies abdominal pain, nausea, vomiting  MUSCULOSKELETAL: See HPI  SKIN: Denies any recent rash or lesion  NEUROLOGICAL: Denies numbness or focal weakness     PHYSICAL EXAM  There were no vitals taken for this visit.    General: AOX4, NAD  Musculoskeletal: Left knee  Inspection: no visual deformities, significant edema and effusion limiting exam. Guarding knee.  ROM: unable to fully extend without pain, flexion to about 60 degrees with significant pain and discomfort  Palpation: Tenderness to palpation diffusely along joint line, medial tibial plateau, lateral patella  Unable to assess anterior drawer, posterior drawer, or Lachmann's due to significant pain and guarding  Negative varus, valgus testing, though notably   [facility] test positive    IMAGING :   XR knee left 3 views 5/14/24  Impression:  1. No acute osseous abnormality.  2. Mild to moderate left knee degenerative change most pronounced in  the patellofemoral compartment.     I have personally reviewed the examination and initial interpretation  and I agree with the findings.     DEL ADKINS MD      ASSESSMENT & PLAN  Ms. Jere Zurita is a 73 year old year old female who presents to clinic today with Pain of the Left Knee    Mild to moderate degenerative  changes on xrays may be playing a role with OA exacerbation however inability to bear weight since injury is uncharacteristic for DJD. Concern for insufficiency fracture, possibly degenerative or acute meniscal derangement, and ligamentous injury    -Continue WBAT with crutches  -Hinged knee bracing, ice, elevation  -MRI left knee without contrast ordered today stat  -Follow up: Virtual video or telephone to discuss after MRI is completed    It was a pleasure seeing Abby Lozada DO, CAQSM  Primary Care Sports Medicine

## 2024-05-24 ENCOUNTER — VIRTUAL VISIT (OUTPATIENT)
Dept: ORTHOPEDICS | Facility: CLINIC | Age: 74
End: 2024-05-24
Payer: COMMERCIAL

## 2024-05-24 ENCOUNTER — TELEPHONE (OUTPATIENT)
Dept: ORTHOPEDICS | Facility: CLINIC | Age: 74
End: 2024-05-24

## 2024-05-24 DIAGNOSIS — M23.204 DEGENERATIVE TEAR OF MEDIAL MENISCUS OF LEFT KNEE: Primary | ICD-10-CM

## 2024-05-24 DIAGNOSIS — M17.12 PRIMARY OSTEOARTHRITIS OF LEFT KNEE: ICD-10-CM

## 2024-05-24 PROCEDURE — 99443 PR PHYSICIAN TELEPHONE EVALUATION 21-30 MIN: CPT | Performed by: FAMILY MEDICINE

## 2024-05-24 NOTE — LETTER
5/24/2024       RE: Radha Zurita  [address]  [facility] [address]     Dear Colleague,    Thank you for referring your patient, Radha Zurita, to the [facility]TS MEDICINE CLINIC [place] at [facility]. Please see a copy of my visit note below.    ESTABLISHED PATIENT FOLLOW-UP VIRTUAL:  MRI follow-up left knee     This encounter was conducted via telephone in lieu of face-to-face encounter due to precautions implemented during COVID-19 pandemic.     What phone number would you like to be contacted at? cell  How would you like to obtain your AVS? MyChart  Patient location: Home  Provider location: [facility]    HISTORY OF PRESENT ILLNESS  Ms. Jere Zurita is a pleasant 73 year old year old female who presents via telephone today for follow-up of left knee pain and MR review.    Date of injury: 5/13/24  Date last seen: 5/21/24  Following Therapeutic Plan: Yes  Pain: Improved  Function: Improved  Interval History: Has been able to walk around home without crutches.  Has been using out of home for worry about added damage.  ROM improved.  Pain persists at times with weight bearing and twisting activities.     Additional medical/Social/Surgical histories reviewed in [facility] and updated as appropriate.    REVIEW OF SYSTEMS (5/24/2024)  CONSTITUTIONAL: Denies fever and weight loss  GASTROINTESTINAL: Denies abdominal pain, nausea, vomiting  MUSCULOSKELETAL: See HPI  SKIN: Denies any recent rash or lesion  NEUROLOGICAL: Denies numbness or focal weakness    IMAGING : MR knee leftt wo Contrast.    IMPRESSION:  1.  Complex tearing of the medial meniscus with a dominant high-grade radial component involving the posterior horn/root and associated meniscal body extrusion.  2.  Tricompartmental chondrosis, most pronounced over the patella and weightbearing medial femoral condyle where there is diffuse high-grade to full-thickness cartilage  loss.  3.  No lateral meniscal tear. The cruciate and collateral ligaments are intact.   4.  Small joint effusion.     ASSESSMENT & PLAN  Ms. Jere Zurita is a 73 year old year old female who presents via telephone today for follow-up of left knee pain that began on 5/13 while walking in her grassy yard.    MR revealing complex degenerative meniscus tear involving root.  Additional high grade to full thickness DJD of patella and medial knee.     Diagnosis:  Primary osteoarthritis of left knee  Degenerative meniscal tear left knee    Treatment options discussed including PT, injections, consideration for tka. Radha is satisfied with progress to date and wishes to begin PT.  Encouraged her to wean crutches as able since no pathology would prohibit weight bearing.  Consideration for corticostoid injection and opted to pursue this next week.      It was a pleasure seeing Radha.    Total Telephone Time: 24 min  Vick Lozada DO, CAQSM  Primary Care Sports Medicine  Department of Orthopedic Surgery  [facility]

## 2024-05-24 NOTE — TELEPHONE ENCOUNTER
----- Message from Shady Aponte ATC sent at 5/24/2024 11:58 AM CDT -----  Please help Radha schedule a 20-min knee injection with Dr. Lozada next week. Ok to take TED slot per Kierra.          Thank you,    Shady

## 2024-05-24 NOTE — TELEPHONE ENCOUNTER
Left Voicemail (1st Attempt) and Sent Mychart (1st Attempt) for the patient to call back and schedule the following:    Appointment type: Return  Provider: Dr. Lozada  Return date: same day 5/31 ok per Shady

## 2024-05-24 NOTE — PROGRESS NOTES
ESTABLISHED PATIENT FOLLOW-UP VIRTUAL:  MRI follow-up left knee     This encounter was conducted via telephone in lieu of face-to-face encounter due to precautions implemented during COVID-19 pandemic.     What phone number would you like to be contacted at? cell  How would you like to obtain your AVS? Maury  Patient location: Home  Provider location: [facility]    HISTORY OF PRESENT ILLNESS  Ms. Jere Zurita is a pleasant 73 year old year old female who presents via telephone today for follow-up of left knee pain and MR review.    Date of injury: 5/13/24  Date last seen: 5/21/24  Following Therapeutic Plan: Yes  Pain: Improved  Function: Improved  Interval History: Has been able to walk around home without crutches.  Has been using out of home for worry about added damage.  ROM improved.  Pain persists at times with weight bearing and twisting activities.     Additional medical/Social/Surgical histories reviewed in [facility] and updated as appropriate.    REVIEW OF SYSTEMS (5/24/2024)  CONSTITUTIONAL: Denies fever and weight loss  GASTROINTESTINAL: Denies abdominal pain, nausea, vomiting  MUSCULOSKELETAL: See HPI  SKIN: Denies any recent rash or lesion  NEUROLOGICAL: Denies numbness or focal weakness    IMAGING : MR knee leftt wo Contrast.    IMPRESSION:  1.  Complex tearing of the medial meniscus with a dominant high-grade radial component involving the posterior horn/root and associated meniscal body extrusion.  2.  Tricompartmental chondrosis, most pronounced over the patella and weightbearing medial femoral condyle where there is diffuse high-grade to full-thickness cartilage loss.  3.  No lateral meniscal tear. The cruciate and collateral ligaments are intact.   4.  Small joint effusion.     ASSESSMENT & PLAN  Ms. Jere Zurita is a 73 year old year old female who presents via telephone today for follow-up of left knee pain that began on 5/13 while walking in her grassy yard.    MR revealing complex degenerative  meniscus tear involving root.  Additional high grade to full thickness DJD of patella and medial knee.     Diagnosis:  Primary osteoarthritis of left knee  Degenerative meniscal tear left knee    Treatment options discussed including PT, injections, consideration for tka. Radha is satisfied with progress to date and wishes to begin PT.  Encouraged her to wean crutches as able since no pathology would prohibit weight bearing.  Consideration for corticostoid injection and opted to pursue this next week.      It was a pleasure seeing Radha.    Total Telephone Time: 24 min  Vick Lozada DO, CAQSM  Primary Care Sports Medicine  Department of Orthopedic Surgery  [facility]

## 2024-05-28 ENCOUNTER — TELEPHONE (OUTPATIENT)
Dept: ORTHOPEDICS | Facility: CLINIC | Age: 74
End: 2024-05-28
Payer: COMMERCIAL

## 2024-05-28 NOTE — TELEPHONE ENCOUNTER
Patient confirmed rescheduled appointment:  Date: 5/31  Time: 2:00 ok natalia Caruso  Visit type: New  Provider: Dr. Lozada

## 2024-05-28 NOTE — TELEPHONE ENCOUNTER
----- Message from Shady Aponte ATC sent at 5/28/2024  1:17 PM CDT -----  If you would please! Doesn't need to be this week necessarily  ----- Message -----  From: Donna Herring  Sent: 5/28/2024   1:11 PM CDT  To: Shady Aponte ATC    Adrian Shady,  Looks like they got scheduled for 7/13. Do you want me to call and offer an earlier date?  Thanks!  Donna  ----- Message -----  From: Shady Aponte ATC  Sent: 5/24/2024  12:44 PM CDT  To: Donna Herring    Same day next Friday is fine!  ----- Message -----  From: Donna Herring  Sent: 5/24/2024  12:22 PM CDT  To: Shady Aponte ATC    Hi! Just fyi, Dr. Lozada does not have any PAOs next week. Do you want me to look into the following week?  Thank you!  Donna  ----- Message -----  From: Shady Aponte ATC  Sent: 5/24/2024  11:58 AM CDT  To: Clinic Coordinators-Ortho-Sports-    Please help Radha schedule a 20-min knee injection with Dr. Lozada next week. Ok to take TED slot per Kierra.          Thank you,    Shady

## 2024-05-31 ENCOUNTER — OFFICE VISIT (OUTPATIENT)
Dept: ORTHOPEDICS | Facility: CLINIC | Age: 74
End: 2024-05-31
Payer: COMMERCIAL

## 2024-05-31 DIAGNOSIS — M17.12 PRIMARY OSTEOARTHRITIS OF LEFT KNEE: ICD-10-CM

## 2024-05-31 DIAGNOSIS — M23.204 DEGENERATIVE TEAR OF MEDIAL MENISCUS OF LEFT KNEE: Primary | ICD-10-CM

## 2024-05-31 PROCEDURE — 20610 DRAIN/INJ JOINT/BURSA W/O US: CPT | Mod: LT | Performed by: FAMILY MEDICINE

## 2024-05-31 RX ORDER — LIDOCAINE HYDROCHLORIDE 10 MG/ML
4 INJECTION, SOLUTION EPIDURAL; INFILTRATION; INTRACAUDAL; PERINEURAL
Status: SHIPPED | OUTPATIENT
Start: 2024-05-31

## 2024-05-31 RX ORDER — TRIAMCINOLONE ACETONIDE 40 MG/ML
40 INJECTION, SUSPENSION INTRA-ARTICULAR; INTRAMUSCULAR
Status: SHIPPED | OUTPATIENT
Start: 2024-05-31

## 2024-05-31 RX ADMIN — TRIAMCINOLONE ACETONIDE 40 MG: 40 INJECTION, SUSPENSION INTRA-ARTICULAR; INTRAMUSCULAR at 14:56

## 2024-05-31 RX ADMIN — LIDOCAINE HYDROCHLORIDE 4 ML: 10 INJECTION, SOLUTION EPIDURAL; INFILTRATION; INTRACAUDAL; PERINEURAL at 14:56

## 2024-05-31 NOTE — NURSING NOTE
[address] [address]  Dept: [phone]  ______________________________________________________________________________    Patient: Radha Zurita   : 1950   MRN: [id number]   May 31, 2024    INVASIVE PROCEDURE SAFETY CHECKLIST    Date: May 31, 2024   Procedure: Left knee CSI   Patient Name: Radha Zurita  MRN: [id number]  YOB: 1950    Action: Complete sections as appropriate. Any discrepancy results in a HARD COPY until resolved.     PRE PROCEDURE:  Patient ID verified with 2 identifiers (name and  or MRN): Yes  Procedure and site verified with patient/designee (when able): Yes  Accurate consent documentation in medical record: Yes  H&P (or appropriate assessment) documented in medical record: Yes  H&P must be up to 20 days prior to procedure and updates within 24 hours of procedure as applicable: NA  Relevant diagnostic and radiology test results appropriately labeled and displayed as applicable: Yes  Procedure site(s) marked with provider initials: NA    TIMEOUT:  Time-Out performed immediately prior to starting procedure, including verbal and active participation of all team members addressing the following:Yes  * Correct patient identify  * Confirmed that the correct side and site are marked  * An accurate procedure consent form  * Agreement on the procedure to be done  * Correct patient position  * Relevant images and results are properly labeled and appropriately displayed  * The need to administer antibiotics or fluids for irrigation purposes during the procedure as applicable   * Safety precautions based on patient history or medication use    DURING PROCEDURE: Verification of correct person, site, and procedures any time the responsibility for care of the patient is transferred to another member of the care team.       Prior to injection, verified patient identity using patient's name and date of  birth.  Due to injection administration, patient instructed to remain in clinic for 15 minutes  afterwards, and to report any adverse reaction to me immediately.    Joint injection was performed.      Drug Amount Wasted:  Yes: 1 mg/ml lidocaine   Vial/Syringe: Single dose vial  Expiration Date:  01/28      Donna Pinzon [facility]  May 31, 2024

## 2024-05-31 NOTE — LETTER
2024      RE: Radha Zurita  [address] [facility] [address]     Dear Colleague,    Thank you for referring your patient, Radha Zurita, to the [facility] SPORTS MEDICINE CLINIC [place]. Please see a copy of my visit note below.    PROCEDURE ENCOUNTER    [facility]  Orthopedics  Vick Lozada DO  May 31, 2024     Name: Radha Zurita  MRN: [id number]  Age: 73 year old  : 1950    Referring provider: JUANA    Diagnosis:   Primary osteoarthritis of left knee  Degenerative meniscal tear left knee    PROCEDURE    Left Knee Injection - Intraarticular  The patient was informed of the risks and the benefits of the procedure and a written consent was signed.  The patient s left knee was prepped with chlorhexidine in sterile fashion.   40 mg of triamcinolone suspension was drawn up into a 5 mL syringe with 4 mL of 1% lidocaine.  Injection was performed using substerile technique.  A 1.5-inch 22-gauge needle was used to enter the lateral aspect of the left knee.  Injection performed successfully without difficulty.  There were no complications. The patient tolerated the procedure well. There was negligible bleeding.   The patient was instructed to ice the knee upon leaving clinic and refrain from overuse over the next 3 days.   The patient was instructed to call or go to the emergency room with any unusual pain, swelling, redness, or if otherwise concerned.  A follow up appointment will be scheduled to evaluate response to the injection, and to assess range of motion and pain.  Vick Lozada DO [facility]  Primary Care Sports Medicine  [facility]    Large Joint Injection/Arthocentesis: L knee joint    Date/Time: 2024 2:56 PM    Performed by: Vick Lozada DO  Authorized by: Vick Lozada DO    Indications:  Pain and osteoarthritis  Needle Size:  22 G  Guidance: landmark guided    Approach:  Anterolateral  Location:  Knee      Medications:   40 mg triamcinolone 40 MG/ML; 4 mL lidocaine (PF) 1 %  Outcome:  Tolerated well, no immediate complications  Procedure discussed: discussed risks, benefits, and alternatives    Consent Given by:  Patient  Timeout: timeout called immediately prior to procedure    Prep: patient was prepped and draped in usual sterile fashion            Again, thank you for allowing me to participate in the care of your patient.      Sincerely,    Vick Lozada, DO

## 2024-05-31 NOTE — PROGRESS NOTES
PROCEDURE ENCOUNTER    [facility]  Orthopedics  Vick Lozada DO  May 31, 2024     Name: Radha Zurita  MRN: [id number]  Age: 73 year old  : 1950    Referring provider: JUANA    Diagnosis:   Primary osteoarthritis of left knee  Degenerative meniscal tear left knee    PROCEDURE    Left Knee Injection - Intraarticular  The patient was informed of the risks and the benefits of the procedure and a written consent was signed.  The patient s left knee was prepped with chlorhexidine in sterile fashion.   40 mg of triamcinolone suspension was drawn up into a 5 mL syringe with 4 mL of 1% lidocaine.  Injection was performed using substerile technique.  A 1.5-inch 22-gauge needle was used to enter the lateral aspect of the left knee.  Injection performed successfully without difficulty.  There were no complications. The patient tolerated the procedure well. There was negligible bleeding.   The patient was instructed to ice the knee upon leaving clinic and refrain from overuse over the next 3 days.   The patient was instructed to call or go to the emergency room with any unusual pain, swelling, redness, or if otherwise concerned.  A follow up appointment will be scheduled to evaluate response to the injection, and to assess range of motion and pain.  Vick Lozada DO CAQSM  Primary Care Sports Medicine  [facility]    Large Joint Injection/Arthocentesis: L knee joint    Date/Time: 2024 2:56 PM    Performed by: Vick Lozada DO  Authorized by: Vick Lozada DO    Indications:  Pain and osteoarthritis  Needle Size:  22 G  Guidance: landmark guided    Approach:  Anterolateral  Location:  Knee      Medications:  40 mg triamcinolone 40 MG/ML; 4 mL lidocaine (PF) 1 %  Outcome:  Tolerated well, no immediate complications  Procedure discussed: discussed risks, benefits, and alternatives    Consent Given by:  Patient  Timeout: timeout called immediately prior to procedure    Prep: patient  Patient called back.  Called patient at [phone] (home). Left message on voicemail to return phone call to triage.  Nolvia Lopez RN [facility] Triage.       was prepped and draped in usual sterile fashion

## 2024-06-29 ENCOUNTER — HEALTH MAINTENANCE LETTER (OUTPATIENT)
Age: 74
End: 2024-06-29

## 2024-07-09 ENCOUNTER — OFFICE VISIT (OUTPATIENT)
Dept: OPHTHALMOLOGY | Facility: CLINIC | Age: 74
End: 2024-07-09
Payer: COMMERCIAL

## 2024-07-09 DIAGNOSIS — H43.811 POSTERIOR VITREOUS DETACHMENT OF RIGHT EYE: ICD-10-CM

## 2024-07-09 DIAGNOSIS — H26.493 PCO (POSTERIOR CAPSULAR OPACIFICATION), BILATERAL: ICD-10-CM

## 2024-07-09 DIAGNOSIS — H52.203 MYOPIA OF BOTH EYES WITH ASTIGMATISM AND PRESBYOPIA: ICD-10-CM

## 2024-07-09 DIAGNOSIS — H52.13 MYOPIA OF BOTH EYES WITH ASTIGMATISM AND PRESBYOPIA: ICD-10-CM

## 2024-07-09 DIAGNOSIS — H53.001 AMBLYOPIA, RIGHT: ICD-10-CM

## 2024-07-09 DIAGNOSIS — H52.4 MYOPIA OF BOTH EYES WITH ASTIGMATISM AND PRESBYOPIA: ICD-10-CM

## 2024-07-09 DIAGNOSIS — Z96.1 PSEUDOPHAKIA, BOTH EYES: Primary | ICD-10-CM

## 2024-07-09 PROCEDURE — 92004 COMPRE OPH EXAM NEW PT 1/>: CPT | Performed by: OPHTHALMOLOGY

## 2024-07-09 PROCEDURE — 92015 DETERMINE REFRACTIVE STATE: CPT | Performed by: OPHTHALMOLOGY

## 2024-07-09 ASSESSMENT — SLIT LAMP EXAM - LIDS
COMMENTS: 1+ DERMATOCHALASIS
COMMENTS: 1+ DERMATOCHALASIS

## 2024-07-09 ASSESSMENT — VISUAL ACUITY
OD_SC: 20/60
OD_SC+: -2
METHOD: SNELLEN - LINEAR
METHOD_MR_RETINOSCOPY: 1
OS_SC: 20/20

## 2024-07-09 ASSESSMENT — TONOMETRY
IOP_METHOD: TONOPEN
OS_IOP_MMHG: 16
OD_IOP_MMHG: 15

## 2024-07-09 ASSESSMENT — CUP TO DISC RATIO
OD_RATIO: 0.2
OS_RATIO: 0.2

## 2024-07-09 ASSESSMENT — REFRACTION_MANIFEST
OD_ADD: +2.50
OD_AXIS: 085
OS_SPHERE: -0.25
OD_CYLINDER: +1.50
OD_SPHERE: -1.00
OS_CYLINDER: SPHERE
OS_ADD: +2.50

## 2024-07-09 ASSESSMENT — CONF VISUAL FIELD
OS_INFERIOR_TEMPORAL_RESTRICTION: 0
OS_SUPERIOR_NASAL_RESTRICTION: 0
METHOD: COUNTING FINGERS
OS_SUPERIOR_TEMPORAL_RESTRICTION: 0
OS_NORMAL: 1
OS_INFERIOR_NASAL_RESTRICTION: 0
OD_SUPERIOR_NASAL_RESTRICTION: 3
OD_INFERIOR_TEMPORAL_RESTRICTION: 3

## 2024-07-09 NOTE — PROGRESS NOTES
HPI       COMPREHENSIVE EYE EXAM    In both eyes.  Since onset it is stable.  Associated symptoms include floaters.  Negative for eye pain and flashes.  Treatments tried include no treatments.  Pain was noted as 0/10.             Comments    Radha Zurita is a(n) 73 year old female who presents for a comprehensive exam. Last eye exam was a couple year(s) ago. Since exam, vision is about the same. No lubricating drops. No flashes with occasional floaters. No eye pain.     Willie Patel COT 12:58 PM July 9, 2024     Hx of CE/IOL both eyes  Hx of amblyopia right eye - attempted patching but patient did not tolerate as a kid          Last edited by Willie Patel on 7/9/2024  1:01 PM.         Review of systems for the eyes was negative other than the pertinent positives/negatives listed in the HPI.      Assessment & Plan    HPI:  Radha Zurita is a 73 year old female with history of hypothyroid, Cataract extraction/intraocular lens both eyes, amblyopia right eye presents for exam. Last exam in [place] several years ago. No acute complaints todya    POHx: Cataract extraction/intraocular lens both eyes, amblyopia right eye  PMHx: hypothyroid  Current Medications:   Current Outpatient Medications   Medication Sig Dispense Refill    fluticasone-salmeterol (ADVAIR) 100-50 MCG/DOSE inhaler Inhale 1 puff into the lungs every 12 hours (Patient not taking: Reported on 10/8/2021) 1 Inhaler 6    levothyroxine (SYNTHROID/LEVOTHROID) 50 MCG tablet Take 1 tablet (50 mcg) by mouth daily 90 tablet 3    PROAIR  (90 Base) MCG/ACT inhaler Inhale 2 puffs into the lungs every 4 hours as needed for shortness of breath / dyspnea or wheezing 18 g 4     Current Facility-Administered Medications   Medication Dose Route Frequency Provider Last Rate Last Admin    lidocaine (PF) (XYLOCAINE) 1 % injection 4 mL  4 mL      4 mL at 05/31/24 1456    triamcinolone (KENALOG-40) injection 40 mg  40 mg      40 mg at 05/31/24 1456     FHx:  PSHx:  Cataract extraction/intraocular lens both eyes [place]      Current Eye Medications:      Assessment & Plan:  (Z96.1) Pseudophakia, both eyes  (primary encounter diagnosis)  (H26.493) PCO (posterior capsular opacification), bilateral  2021  Doing well  Mild posterior capsular opacity (PCO)  Discussed treatment options if desired      (H52.13,  H52.203,  H52.4) Myopia of both eyes with astigmatism and presbyopia  Patient has minimal change in myopia but a copy of today's glasses prescription was given.  The patient may wish to update the glasses if the lenses are scratched or the frames are too small.  Presbyopia is difficulty seeing up close and is treated with bifocals or over the counter reading glasses  Advised full time glasses wear given amblyopia    (H53.001) Amblyopia, right  Advised full time glasses wear given amblyopia    (H43.811) Posterior vitreous detachment of right eye  Known for years  Observe    Return in about 1 year (around 7/9/2025) for Annual Visit-v/t/d/MRx.        Walter Guzman MD     Attending Physician Attestation:  Complete documentation of historical and exam elements from today's encounter can be found in the full encounter summary report (not reduplicated in this progress note).  I personally obtained the chief complaint(s) and history of present illness.  I confirmed and edited as necessary the review of systems, past medical/surgical history, family history, social history, and examination findings as documented by others; and I examined the patient myself.  I personally reviewed the relevant tests, images, and reports as documented above.  I formulated and edited as necessary the assessment and plan and discussed the findings and management plan with the patient and family. - Walter Guzman MD

## 2024-07-21 ENCOUNTER — MYC MEDICAL ADVICE (OUTPATIENT)
Dept: ORTHOPEDICS | Facility: CLINIC | Age: 74
End: 2024-07-21
Payer: COMMERCIAL

## 2025-07-13 ENCOUNTER — HEALTH MAINTENANCE LETTER (OUTPATIENT)
Age: 75
End: 2025-07-13

## (undated) DEVICE — GLOVE PROTEXIS W/NEU-THERA 7.5  2D73TE75

## (undated) DEVICE — GLOVE PROTEXIS W/NEU-THERA 7.0  2D73TE70

## (undated) DEVICE — DECANTER VIAL 2006S

## (undated) DEVICE — DRSG STERI STRIP 1/2X4" R1547

## (undated) DEVICE — SU MONOCRYL 4-0 PS-2 18" UND Y496G

## (undated) DEVICE — GOWN LG DISP 9515

## (undated) DEVICE — SOL WATER IRRIG 1000ML BOTTLE 07139-09

## (undated) DEVICE — BNDG ELASTIC 2"X5YDS UNSTERILE 6611-20

## (undated) DEVICE — PREP SKIN SCRUB TRAY 4461A

## (undated) DEVICE — GLOVE PROTEXIS BLUE W/NEU-THERA 7.0  2D73EB70

## (undated) DEVICE — BLADE KNIFE BEAVER 376700

## (undated) DEVICE — PREP CHLORHEXIDINE 4% 4OZ (HIBICLENS) 57504

## (undated) DEVICE — PACK HAND

## (undated) DEVICE — BNDG ELASTIC 4"X5YDS UNSTERILE 6611-40

## (undated) DEVICE — SOL ADH LIQUID BENZOIN SWAB 0.6ML C1544

## (undated) RX ORDER — LIDOCAINE HYDROCHLORIDE 10 MG/ML
INJECTION, SOLUTION INFILTRATION; PERINEURAL
Status: DISPENSED
Start: 2018-11-20

## (undated) RX ORDER — PROPOFOL 10 MG/ML
INJECTION, EMULSION INTRAVENOUS
Status: DISPENSED
Start: 2018-11-20

## (undated) RX ORDER — LIDOCAINE HYDROCHLORIDE 10 MG/ML
INJECTION, SOLUTION EPIDURAL; INFILTRATION; INTRACAUDAL; PERINEURAL
Status: DISPENSED
Start: 2018-11-20

## (undated) RX ORDER — LIDOCAINE HYDROCHLORIDE 10 MG/ML
INJECTION, SOLUTION EPIDURAL; INFILTRATION; INTRACAUDAL; PERINEURAL
Status: DISPENSED
Start: 2024-05-31

## (undated) RX ORDER — BUPIVACAINE HYDROCHLORIDE 5 MG/ML
INJECTION, SOLUTION PERINEURAL
Status: DISPENSED
Start: 2018-11-20

## (undated) RX ORDER — ONDANSETRON 2 MG/ML
INJECTION INTRAMUSCULAR; INTRAVENOUS
Status: DISPENSED
Start: 2018-11-20

## (undated) RX ORDER — GABAPENTIN 100 MG/1
CAPSULE ORAL
Status: DISPENSED
Start: 2018-11-20

## (undated) RX ORDER — FENTANYL CITRATE 50 UG/ML
INJECTION, SOLUTION INTRAMUSCULAR; INTRAVENOUS
Status: DISPENSED
Start: 2018-11-20

## (undated) RX ORDER — ACETAMINOPHEN 325 MG/1
TABLET ORAL
Status: DISPENSED
Start: 2018-11-20

## (undated) RX ORDER — DEXAMETHASONE SODIUM PHOSPHATE 4 MG/ML
INJECTION, SOLUTION INTRA-ARTICULAR; INTRALESIONAL; INTRAMUSCULAR; INTRAVENOUS; SOFT TISSUE
Status: DISPENSED
Start: 2018-11-20

## (undated) RX ORDER — TRIAMCINOLONE ACETONIDE 40 MG/ML
INJECTION, SUSPENSION INTRA-ARTICULAR; INTRAMUSCULAR
Status: DISPENSED
Start: 2024-05-31